# Patient Record
Sex: MALE | Race: OTHER | NOT HISPANIC OR LATINO | URBAN - METROPOLITAN AREA
[De-identification: names, ages, dates, MRNs, and addresses within clinical notes are randomized per-mention and may not be internally consistent; named-entity substitution may affect disease eponyms.]

---

## 2023-01-04 ENCOUNTER — INPATIENT (INPATIENT)
Facility: HOSPITAL | Age: 43
LOS: 6 days | Discharge: HOME | End: 2023-01-11
Attending: HOSPITALIST | Admitting: HOSPITALIST
Payer: MEDICAID

## 2023-01-04 VITALS
DIASTOLIC BLOOD PRESSURE: 86 MMHG | HEART RATE: 78 BPM | SYSTOLIC BLOOD PRESSURE: 135 MMHG | RESPIRATION RATE: 19 BRPM | TEMPERATURE: 98 F | OXYGEN SATURATION: 99 %

## 2023-01-04 LAB
ALBUMIN SERPL ELPH-MCNC: 4.3 G/DL — SIGNIFICANT CHANGE UP (ref 3.5–5.2)
ALP SERPL-CCNC: 87 U/L — SIGNIFICANT CHANGE UP (ref 30–115)
ALT FLD-CCNC: 5 U/L — SIGNIFICANT CHANGE UP (ref 0–41)
ANION GAP SERPL CALC-SCNC: 8 MMOL/L — SIGNIFICANT CHANGE UP (ref 7–14)
APPEARANCE UR: CLEAR — SIGNIFICANT CHANGE UP
AST SERPL-CCNC: 5 U/L — SIGNIFICANT CHANGE UP (ref 0–41)
BACTERIA # UR AUTO: NEGATIVE — SIGNIFICANT CHANGE UP
BASE EXCESS BLDV CALC-SCNC: 2.4 MMOL/L — SIGNIFICANT CHANGE UP (ref -2–3)
BASOPHILS # BLD AUTO: 0.06 K/UL — SIGNIFICANT CHANGE UP (ref 0–0.2)
BASOPHILS NFR BLD AUTO: 0.4 % — SIGNIFICANT CHANGE UP (ref 0–1)
BILIRUB DIRECT SERPL-MCNC: 0.2 MG/DL — SIGNIFICANT CHANGE UP (ref 0–0.3)
BILIRUB INDIRECT FLD-MCNC: 1.2 MG/DL — SIGNIFICANT CHANGE UP (ref 0.2–1.2)
BILIRUB SERPL-MCNC: 1.4 MG/DL — HIGH (ref 0.2–1.2)
BILIRUB UR-MCNC: ABNORMAL
BUN SERPL-MCNC: 7 MG/DL — LOW (ref 10–20)
CA-I SERPL-SCNC: 1.13 MMOL/L — LOW (ref 1.15–1.33)
CALCIUM SERPL-MCNC: 8.2 MG/DL — LOW (ref 8.4–10.4)
CHLORIDE SERPL-SCNC: 95 MMOL/L — LOW (ref 98–110)
CO2 SERPL-SCNC: 23 MMOL/L — SIGNIFICANT CHANGE UP (ref 17–32)
COLOR SPEC: YELLOW — SIGNIFICANT CHANGE UP
CREAT SERPL-MCNC: 0.8 MG/DL — SIGNIFICANT CHANGE UP (ref 0.7–1.5)
DIFF PNL FLD: NEGATIVE — SIGNIFICANT CHANGE UP
EGFR: 113 ML/MIN/1.73M2 — SIGNIFICANT CHANGE UP
EOSINOPHIL # BLD AUTO: 0.26 K/UL — SIGNIFICANT CHANGE UP (ref 0–0.7)
EOSINOPHIL NFR BLD AUTO: 1.7 % — SIGNIFICANT CHANGE UP (ref 0–8)
EPI CELLS # UR: 2 /HPF — SIGNIFICANT CHANGE UP (ref 0–5)
GAS PNL BLDV: 134 MMOL/L — LOW (ref 136–145)
GAS PNL BLDV: SIGNIFICANT CHANGE UP
GLUCOSE SERPL-MCNC: 207 MG/DL — HIGH (ref 70–99)
GLUCOSE UR QL: ABNORMAL
HCO3 BLDV-SCNC: 27 MMOL/L — SIGNIFICANT CHANGE UP (ref 22–29)
HCT VFR BLD CALC: 42.4 % — SIGNIFICANT CHANGE UP (ref 42–52)
HCT VFR BLDA CALC: 41 % — SIGNIFICANT CHANGE UP (ref 39–51)
HGB BLD CALC-MCNC: 13.8 G/DL — SIGNIFICANT CHANGE UP (ref 12.6–17.4)
HGB BLD-MCNC: 14 G/DL — SIGNIFICANT CHANGE UP (ref 14–18)
HYALINE CASTS # UR AUTO: 10 /LPF — HIGH (ref 0–7)
IMM GRANULOCYTES NFR BLD AUTO: 0.4 % — HIGH (ref 0.1–0.3)
KETONES UR-MCNC: ABNORMAL
LACTATE BLDV-MCNC: 0.9 MMOL/L — SIGNIFICANT CHANGE UP (ref 0.5–2)
LACTATE SERPL-SCNC: 0.9 MMOL/L — SIGNIFICANT CHANGE UP (ref 0.7–2)
LEUKOCYTE ESTERASE UR-ACNC: NEGATIVE — SIGNIFICANT CHANGE UP
LIDOCAIN IGE QN: 124 U/L — HIGH (ref 7–60)
LYMPHOCYTES # BLD AUTO: 19 % — LOW (ref 20.5–51.1)
LYMPHOCYTES # BLD AUTO: 2.85 K/UL — SIGNIFICANT CHANGE UP (ref 1.2–3.4)
MCHC RBC-ENTMCNC: 28.9 PG — SIGNIFICANT CHANGE UP (ref 27–31)
MCHC RBC-ENTMCNC: 33 G/DL — SIGNIFICANT CHANGE UP (ref 32–37)
MCV RBC AUTO: 87.4 FL — SIGNIFICANT CHANGE UP (ref 80–94)
MONOCYTES # BLD AUTO: 0.82 K/UL — HIGH (ref 0.1–0.6)
MONOCYTES NFR BLD AUTO: 5.5 % — SIGNIFICANT CHANGE UP (ref 1.7–9.3)
NEUTROPHILS # BLD AUTO: 10.93 K/UL — HIGH (ref 1.4–6.5)
NEUTROPHILS NFR BLD AUTO: 73 % — SIGNIFICANT CHANGE UP (ref 42.2–75.2)
NITRITE UR-MCNC: NEGATIVE — SIGNIFICANT CHANGE UP
NRBC # BLD: 0 /100 WBCS — SIGNIFICANT CHANGE UP (ref 0–0)
PCO2 BLDV: 42 MMHG — SIGNIFICANT CHANGE UP (ref 42–55)
PH BLDV: 7.42 — SIGNIFICANT CHANGE UP (ref 7.32–7.43)
PH UR: 6 — SIGNIFICANT CHANGE UP (ref 5–8)
PLATELET # BLD AUTO: 220 K/UL — SIGNIFICANT CHANGE UP (ref 130–400)
PO2 BLDV: 49 MMHG — SIGNIFICANT CHANGE UP
POTASSIUM BLDV-SCNC: 3.6 MMOL/L — SIGNIFICANT CHANGE UP (ref 3.5–5.1)
POTASSIUM SERPL-MCNC: 6.9 MMOL/L — CRITICAL HIGH (ref 3.5–5)
POTASSIUM SERPL-SCNC: 6.9 MMOL/L — CRITICAL HIGH (ref 3.5–5)
PROT SERPL-MCNC: 7.4 G/DL — SIGNIFICANT CHANGE UP (ref 6–8)
PROT UR-MCNC: ABNORMAL
RBC # BLD: 4.85 M/UL — SIGNIFICANT CHANGE UP (ref 4.7–6.1)
RBC # FLD: 12.7 % — SIGNIFICANT CHANGE UP (ref 11.5–14.5)
RBC CASTS # UR COMP ASSIST: 2 /HPF — SIGNIFICANT CHANGE UP (ref 0–4)
SAO2 % BLDV: 83 % — SIGNIFICANT CHANGE UP
SARS-COV-2 RNA SPEC QL NAA+PROBE: SIGNIFICANT CHANGE UP
SODIUM SERPL-SCNC: 126 MMOL/L — LOW (ref 135–146)
SP GR SPEC: 1.05 — HIGH (ref 1.01–1.03)
TROPONIN T SERPL-MCNC: <0.01 NG/ML — SIGNIFICANT CHANGE UP
UROBILINOGEN FLD QL: ABNORMAL
WBC # BLD: 14.98 K/UL — HIGH (ref 4.8–10.8)
WBC # FLD AUTO: 14.98 K/UL — HIGH (ref 4.8–10.8)
WBC UR QL: 2 /HPF — SIGNIFICANT CHANGE UP (ref 0–5)

## 2023-01-04 PROCEDURE — 99285 EMERGENCY DEPT VISIT HI MDM: CPT

## 2023-01-04 PROCEDURE — 71045 X-RAY EXAM CHEST 1 VIEW: CPT | Mod: 26

## 2023-01-04 PROCEDURE — 74177 CT ABD & PELVIS W/CONTRAST: CPT | Mod: 26,MA

## 2023-01-04 PROCEDURE — 93010 ELECTROCARDIOGRAM REPORT: CPT

## 2023-01-04 RX ORDER — SODIUM CHLORIDE 9 MG/ML
1000 INJECTION INTRAMUSCULAR; INTRAVENOUS; SUBCUTANEOUS ONCE
Refills: 0 | Status: COMPLETED | OUTPATIENT
Start: 2023-01-04 | End: 2023-01-04

## 2023-01-04 RX ORDER — ONDANSETRON 8 MG/1
4 TABLET, FILM COATED ORAL ONCE
Refills: 0 | Status: COMPLETED | OUTPATIENT
Start: 2023-01-04 | End: 2023-01-04

## 2023-01-04 RX ORDER — MORPHINE SULFATE 50 MG/1
4 CAPSULE, EXTENDED RELEASE ORAL ONCE
Refills: 0 | Status: DISCONTINUED | OUTPATIENT
Start: 2023-01-04 | End: 2023-01-04

## 2023-01-04 RX ORDER — FAMOTIDINE 10 MG/ML
20 INJECTION INTRAVENOUS ONCE
Refills: 0 | Status: COMPLETED | OUTPATIENT
Start: 2023-01-04 | End: 2023-01-04

## 2023-01-04 RX ORDER — SODIUM CHLORIDE 9 MG/ML
1000 INJECTION, SOLUTION INTRAVENOUS ONCE
Refills: 0 | Status: COMPLETED | OUTPATIENT
Start: 2023-01-04 | End: 2023-01-04

## 2023-01-04 RX ORDER — SODIUM CHLORIDE 9 MG/ML
1000 INJECTION, SOLUTION INTRAVENOUS
Refills: 0 | Status: DISCONTINUED | OUTPATIENT
Start: 2023-01-04 | End: 2023-01-05

## 2023-01-04 RX ADMIN — ONDANSETRON 4 MILLIGRAM(S): 8 TABLET, FILM COATED ORAL at 18:08

## 2023-01-04 RX ADMIN — MORPHINE SULFATE 4 MILLIGRAM(S): 50 CAPSULE, EXTENDED RELEASE ORAL at 18:08

## 2023-01-04 RX ADMIN — FAMOTIDINE 20 MILLIGRAM(S): 10 INJECTION INTRAVENOUS at 18:08

## 2023-01-04 RX ADMIN — SODIUM CHLORIDE 1000 MILLILITER(S): 9 INJECTION INTRAMUSCULAR; INTRAVENOUS; SUBCUTANEOUS at 18:08

## 2023-01-04 RX ADMIN — ONDANSETRON 4 MILLIGRAM(S): 8 TABLET, FILM COATED ORAL at 18:39

## 2023-01-04 NOTE — ED PROVIDER NOTE - CLINICAL SUMMARY MEDICAL DECISION MAKING FREE TEXT BOX
42-year-old male with history of pancreatitis, in ER with c/o abdominal pain which started yesterday.  No N/V/D.  Patient recently moved here from [facility], does not have any established physicians here.  Labs reviewed: WBC 14, Hgb 14.0 CMP with hyponatremia (126) BUN/creatinine 7/0.8, K+ hemolyzed > 3.6 on VBG, lactate negative.  AST 5 ALT 5, T bili 1.4, lipase 124.  CT abdomen: Findings compatible with acute interstitial edematous pancreatitis, no evidence of necrosis or peripancreatic fluid collections, hepatic steatosis and hepatomegaly.  Patient given IVF, pain meds in ER.  To admit to medicine for continued care/GI evaluation.

## 2023-01-04 NOTE — ED PROVIDER NOTE - NS ED ROS FT
Constitutional: (-) fever  Eyes/ENT: (-) blurry vision, (-) epistaxis  Cardiovascular: (-) chest pain, (-) syncope  Respiratory: (-) cough, (-) shortness of breath  Gastrointestinal: (-) vomiting, (-) diarrhea, (+)abd pain  Musculoskeletal: (-) neck pain, (-) back pain, (-) joint pain  Integumentary: (-) rash, (-) edema  Neurological: (-) headache, (-) altered mental status  Psychiatric: (-) hallucinations  Allergic/Immunologic: (-) pruritus

## 2023-01-04 NOTE — ED PROVIDER NOTE - OBJECTIVE STATEMENT
42-year-old male with history of pancreas problem presents to the ED accompanied by sister complaining of bandlike abdominal pain that started yesterday afternoon.  Patient states pain got worse today.  Patient attempted to take pantoprazole with no relief.  No nausea, vomiting, diarrhea, fever, chills or urinary symptoms.

## 2023-01-04 NOTE — ED PROVIDER NOTE - ATTENDING APP SHARED VISIT CONTRIBUTION OF CARE
42-year-old male with h/o pancreatitis, in ER with c/o abdominal pain which started yesterday.  Diffuse pain to mid abdomen, radiating around to the back.  Did waxing and waning yesterday, pain worse today.  Denies any N/V/D.  No urinary symptoms.  No F/C.  No CP/SOB.  Denies any recent alcohol use.  s/p appendectomy.  Per patient sister, patient has been on meds for pancreatitis in the past, however recently moved here 1 1/2 months ago, does not have any physicians here.  PE - nad, nc/at, eomi, perrl, op - clear, mmm, neck supple, cta b/l, no w/r/r, rrr, abd- soft, + diffuse tenderness, nabs, from x 4, no LE swelling/tenderness, A&O x 3, cn 2-12 intact, no focal motor/sensory deficits.   -Check labs, CT abdomen 42-year-old male with h/o pancreatitis, in ER with c/o abdominal pain which started yesterday.  Diffuse pain to mid abdomen, radiating around to the back.  Did waxing and waning yesterday, pain worse today.  Denies any N/V/D.  No urinary symptoms.  No F/C.  No CP/SOB.  Denies any recent alcohol use.  s/p appendectomy.  Per patient's sister, patient has been on meds for pancreatitis in the past, however recently moved here 1 1/2 months ago from [facility], does not have any physicians here. Drinks socially, denies any recent heavy alcohol use.   PE - nad, nc/at, eomi, perrl, op - clear, mmm, neck supple, cta b/l, no w/r/r, rrr, abd- soft, + diffuse tenderness, nabs, from x 4, no LE swelling/tenderness, A&O x 3, cn 2-12 intact, no focal motor/sensory deficits.   -Check labs, CT abdomen

## 2023-01-04 NOTE — ED ADULT TRIAGE NOTE - INTERNATIONAL TRAVEL
Detail Level: Detailed Otc Regimen: Recommended supplement N acetyl cysteine 600 mg tablets weeks 1-3 take 1 tablet 2 times daily, weeks 4-6 take 2 tablets 2 a day, week 7 and on take 2 tablets in the morning and 3 tablets at night. Continue Regimen: Antipsychotics per PCP Plan: Recommended patient to follow up with her primary doctor about starting this new supplement. Recommend she inform her PCP that these sensations started after discontinuing amitriptyline. No

## 2023-01-04 NOTE — ED PROVIDER NOTE - OTHER SOCIAL DETERMINANTS OF HEALTH
just moved here from [facility], does not have any established physicians here.

## 2023-01-04 NOTE — ED PROVIDER NOTE - NS ED ATTENDING STATEMENT MOD
This was a shared visit with the JAG. I reviewed and verified the documentation and independently performed the documented:

## 2023-01-04 NOTE — ED ADULT NURSE NOTE - NSIMPLEMENTINTERV_GEN_ALL_ED
Implemented All Universal Safety Interventions:  [place] to call system. Call bell, personal items and telephone within reach. Instruct patient to call for assistance. Room bathroom lighting operational. Non-slip footwear when patient is off stretcher. Physically safe environment: no spills, clutter or unnecessary equipment. Stretcher in lowest position, wheels locked, appropriate side rails in place.

## 2023-01-04 NOTE — ED PROVIDER NOTE - PHYSICAL EXAMINATION
Vital Signs: I have reviewed the initial vital signs.  Constitutional: NAD, well-nourished, appears stated age, no acute distress.  HEENT: Airway patent, moist MM, no erythema/swelling/deformity of oral structures. EOMI, PERRLA.  CV: regular rate, regular rhythm, well-perfused extremities, 2+ b/l DP and radial pulses equal.  Lungs: BCTA, no increased WOB.  ABD: +tender upper abdomen, ND, no guarding or rebound, no pulsatile mass, no hernias.   MSK: Neck supple, nontender, nl ROM, no stepoff. Chest nontender. Back nontender. Ext nontender, nl rom, no deformity.   INTEG: Skin warm, dry, no rash.  NEURO: A&Ox3, normal strength, nl sensation throughout, normal speech.   PSYCH: Calm, cooperative, normal affect and interaction.

## 2023-01-05 LAB
A1C WITH ESTIMATED AVERAGE GLUCOSE RESULT: 10.5 % — HIGH (ref 4–5.6)
ALBUMIN SERPL ELPH-MCNC: 3.3 G/DL — LOW (ref 3.5–5.2)
ALBUMIN SERPL ELPH-MCNC: 3.7 G/DL — SIGNIFICANT CHANGE UP (ref 3.5–5.2)
ALP SERPL-CCNC: 81 U/L — SIGNIFICANT CHANGE UP (ref 30–115)
ALP SERPL-CCNC: 90 U/L — SIGNIFICANT CHANGE UP (ref 30–115)
ALT FLD-CCNC: 20 U/L — SIGNIFICANT CHANGE UP (ref 0–41)
ALT FLD-CCNC: <5 U/L — SIGNIFICANT CHANGE UP (ref 0–41)
ANION GAP SERPL CALC-SCNC: 8 MMOL/L — SIGNIFICANT CHANGE UP (ref 7–14)
APPEARANCE UR: CLEAR — SIGNIFICANT CHANGE UP
APTT BLD: 32.8 SEC — SIGNIFICANT CHANGE UP (ref 27–39.2)
AST SERPL-CCNC: 9 U/L — SIGNIFICANT CHANGE UP (ref 0–41)
AST SERPL-CCNC: <5 U/L — SIGNIFICANT CHANGE UP (ref 0–41)
BASOPHILS # BLD AUTO: 0.03 K/UL — SIGNIFICANT CHANGE UP (ref 0–0.2)
BASOPHILS NFR BLD AUTO: 0.2 % — SIGNIFICANT CHANGE UP (ref 0–1)
BILIRUB SERPL-MCNC: 1.5 MG/DL — HIGH (ref 0.2–1.2)
BILIRUB SERPL-MCNC: 1.5 MG/DL — HIGH (ref 0.2–1.2)
BILIRUB UR-MCNC: NEGATIVE — SIGNIFICANT CHANGE UP
BLD GP AB SCN SERPL QL: SIGNIFICANT CHANGE UP
BUN SERPL-MCNC: 6 MG/DL — LOW (ref 10–20)
BUN SERPL-MCNC: 6 MG/DL — LOW (ref 10–20)
BUN SERPL-MCNC: 7 MG/DL — LOW (ref 10–20)
CALCIUM SERPL-MCNC: 8.2 MG/DL — LOW (ref 8.4–10.5)
CALCIUM SERPL-MCNC: 8.3 MG/DL — LOW (ref 8.4–10.4)
CALCIUM SERPL-MCNC: 8.5 MG/DL — SIGNIFICANT CHANGE UP (ref 8.4–10.5)
CERULOPLASMIN SERPL-MCNC: <3 MG/DL — LOW (ref 15–30)
CHLORIDE SERPL-SCNC: 101 MMOL/L — SIGNIFICANT CHANGE UP (ref 98–110)
CHLORIDE SERPL-SCNC: 103 MMOL/L — SIGNIFICANT CHANGE UP (ref 98–110)
CHLORIDE SERPL-SCNC: 99 MMOL/L — SIGNIFICANT CHANGE UP (ref 98–110)
CHOLEST SERPL-MCNC: 469 MG/DL — HIGH
CO2 SERPL-SCNC: 24 MMOL/L — SIGNIFICANT CHANGE UP (ref 17–32)
CO2 SERPL-SCNC: 24 MMOL/L — SIGNIFICANT CHANGE UP (ref 17–32)
CO2 SERPL-SCNC: 27 MMOL/L — SIGNIFICANT CHANGE UP (ref 17–32)
COLOR SPEC: YELLOW — SIGNIFICANT CHANGE UP
CREAT SERPL-MCNC: 0.7 MG/DL — SIGNIFICANT CHANGE UP (ref 0.7–1.5)
CREAT SERPL-MCNC: 0.8 MG/DL — SIGNIFICANT CHANGE UP (ref 0.7–1.5)
CREAT SERPL-MCNC: 0.8 MG/DL — SIGNIFICANT CHANGE UP (ref 0.7–1.5)
DIFF PNL FLD: NEGATIVE — SIGNIFICANT CHANGE UP
EGFR: 113 ML/MIN/1.73M2 — SIGNIFICANT CHANGE UP
EGFR: 113 ML/MIN/1.73M2 — SIGNIFICANT CHANGE UP
EGFR: 117 ML/MIN/1.73M2 — SIGNIFICANT CHANGE UP
EOSINOPHIL # BLD AUTO: 0.08 K/UL — SIGNIFICANT CHANGE UP (ref 0–0.7)
EOSINOPHIL NFR BLD AUTO: 0.6 % — SIGNIFICANT CHANGE UP (ref 0–8)
ESTIMATED AVERAGE GLUCOSE: 255 MG/DL — HIGH (ref 68–114)
GLUCOSE BLDC GLUCOMTR-MCNC: 139 MG/DL — HIGH (ref 70–99)
GLUCOSE BLDC GLUCOMTR-MCNC: 156 MG/DL — HIGH (ref 70–99)
GLUCOSE BLDC GLUCOMTR-MCNC: 167 MG/DL — HIGH (ref 70–99)
GLUCOSE BLDC GLUCOMTR-MCNC: 167 MG/DL — HIGH (ref 70–99)
GLUCOSE BLDC GLUCOMTR-MCNC: 172 MG/DL — HIGH (ref 70–99)
GLUCOSE BLDC GLUCOMTR-MCNC: 177 MG/DL — HIGH (ref 70–99)
GLUCOSE BLDC GLUCOMTR-MCNC: 190 MG/DL — HIGH (ref 70–99)
GLUCOSE SERPL-MCNC: 153 MG/DL — HIGH (ref 70–99)
GLUCOSE SERPL-MCNC: 208 MG/DL — HIGH (ref 70–99)
GLUCOSE SERPL-MCNC: 229 MG/DL — HIGH (ref 70–99)
GLUCOSE UR QL: ABNORMAL
HCT VFR BLD CALC: 43.5 % — SIGNIFICANT CHANGE UP (ref 42–52)
HDLC SERPL-MCNC: 23 MG/DL — LOW
HGB BLD-MCNC: 15.2 G/DL — SIGNIFICANT CHANGE UP (ref 14–18)
IMM GRANULOCYTES NFR BLD AUTO: 0.6 % — HIGH (ref 0.1–0.3)
INR BLD: 1.03 RATIO — SIGNIFICANT CHANGE UP (ref 0.65–1.3)
KETONES UR-MCNC: ABNORMAL
LDLC SERPL DIRECT ASSAY-MCNC: 28 MG/DL — SIGNIFICANT CHANGE UP
LEUKOCYTE ESTERASE UR-ACNC: NEGATIVE — SIGNIFICANT CHANGE UP
LIPID PNL WITH DIRECT LDL SERPL: ABNORMAL
LYMPHOCYTES # BLD AUTO: 23.5 % — SIGNIFICANT CHANGE UP (ref 20.5–51.1)
LYMPHOCYTES # BLD AUTO: 3.31 K/UL — SIGNIFICANT CHANGE UP (ref 1.2–3.4)
MAGNESIUM SERPL-MCNC: 1.8 MG/DL — SIGNIFICANT CHANGE UP (ref 1.8–2.4)
MCHC RBC-ENTMCNC: 31.1 PG — HIGH (ref 27–31)
MCHC RBC-ENTMCNC: 34.9 G/DL — SIGNIFICANT CHANGE UP (ref 32–37)
MCV RBC AUTO: 89.1 FL — SIGNIFICANT CHANGE UP (ref 80–94)
MONOCYTES # BLD AUTO: 0.68 K/UL — HIGH (ref 0.1–0.6)
MONOCYTES NFR BLD AUTO: 4.8 % — SIGNIFICANT CHANGE UP (ref 1.7–9.3)
NEUTROPHILS # BLD AUTO: 9.91 K/UL — HIGH (ref 1.4–6.5)
NEUTROPHILS NFR BLD AUTO: 70.3 % — SIGNIFICANT CHANGE UP (ref 42.2–75.2)
NITRITE UR-MCNC: NEGATIVE — SIGNIFICANT CHANGE UP
NON HDL CHOLESTEROL: 446 MG/DL — HIGH
NRBC # BLD: 0 /100 WBCS — SIGNIFICANT CHANGE UP (ref 0–0)
OSMOLALITY SERPL: 292 MOS/KG — SIGNIFICANT CHANGE UP (ref 275–300)
PH UR: 6.5 — SIGNIFICANT CHANGE UP (ref 5–8)
PLATELET # BLD AUTO: 164 K/UL — SIGNIFICANT CHANGE UP (ref 130–400)
POTASSIUM SERPL-MCNC: 3.7 MMOL/L — SIGNIFICANT CHANGE UP (ref 3.5–5)
POTASSIUM SERPL-MCNC: 3.8 MMOL/L — SIGNIFICANT CHANGE UP (ref 3.5–5)
POTASSIUM SERPL-MCNC: 3.9 MMOL/L — SIGNIFICANT CHANGE UP (ref 3.5–5)
POTASSIUM SERPL-SCNC: 3.7 MMOL/L — SIGNIFICANT CHANGE UP (ref 3.5–5)
POTASSIUM SERPL-SCNC: 3.8 MMOL/L — SIGNIFICANT CHANGE UP (ref 3.5–5)
POTASSIUM SERPL-SCNC: 3.9 MMOL/L — SIGNIFICANT CHANGE UP (ref 3.5–5)
PROT SERPL-MCNC: 5.8 G/DL — LOW (ref 6–8)
PROT SERPL-MCNC: 6.5 G/DL — SIGNIFICANT CHANGE UP (ref 6–8)
PROT UR-MCNC: SIGNIFICANT CHANGE UP
PROTHROM AB SERPL-ACNC: 11.7 SEC — SIGNIFICANT CHANGE UP (ref 9.95–12.87)
RBC # BLD: 4.88 M/UL — SIGNIFICANT CHANGE UP (ref 4.7–6.1)
RBC # FLD: 12.6 % — SIGNIFICANT CHANGE UP (ref 11.5–14.5)
SODIUM SERPL-SCNC: 131 MMOL/L — LOW (ref 135–146)
SODIUM SERPL-SCNC: 133 MMOL/L — LOW (ref 135–146)
SODIUM SERPL-SCNC: 138 MMOL/L — SIGNIFICANT CHANGE UP (ref 135–146)
SP GR SPEC: 1.01 — SIGNIFICANT CHANGE UP (ref 1.01–1.03)
TRIGL SERPL-MCNC: 2411 MG/DL — HIGH
TSH SERPL-MCNC: 0.43 UIU/ML — SIGNIFICANT CHANGE UP (ref 0.27–4.2)
UROBILINOGEN FLD QL: ABNORMAL
WBC # BLD: 14.09 K/UL — HIGH (ref 4.8–10.8)
WBC # FLD AUTO: 14.09 K/UL — HIGH (ref 4.8–10.8)

## 2023-01-05 PROCEDURE — 99223 1ST HOSP IP/OBS HIGH 75: CPT

## 2023-01-05 PROCEDURE — 76705 ECHO EXAM OF ABDOMEN: CPT | Mod: 26

## 2023-01-05 PROCEDURE — 99233 SBSQ HOSP IP/OBS HIGH 50: CPT

## 2023-01-05 RX ORDER — SODIUM CHLORIDE 9 MG/ML
1000 INJECTION, SOLUTION INTRAVENOUS
Refills: 0 | Status: DISCONTINUED | OUTPATIENT
Start: 2023-01-05 | End: 2023-01-05

## 2023-01-05 RX ORDER — PANTOPRAZOLE SODIUM 20 MG/1
40 TABLET, DELAYED RELEASE ORAL
Refills: 0 | Status: DISCONTINUED | OUTPATIENT
Start: 2023-01-05 | End: 2023-01-05

## 2023-01-05 RX ORDER — DEXTROSE 50 % IN WATER 50 %
50 SYRINGE (ML) INTRAVENOUS
Refills: 0 | Status: DISCONTINUED | OUTPATIENT
Start: 2023-01-05 | End: 2023-01-11

## 2023-01-05 RX ORDER — MORPHINE SULFATE 50 MG/1
2 CAPSULE, EXTENDED RELEASE ORAL EVERY 6 HOURS
Refills: 0 | Status: DISCONTINUED | OUTPATIENT
Start: 2023-01-05 | End: 2023-01-11

## 2023-01-05 RX ORDER — ONDANSETRON 8 MG/1
4 TABLET, FILM COATED ORAL EVERY 8 HOURS
Refills: 0 | Status: DISCONTINUED | OUTPATIENT
Start: 2023-01-05 | End: 2023-01-11

## 2023-01-05 RX ORDER — NICOTINE POLACRILEX 2 MG
1 GUM BUCCAL DAILY
Refills: 0 | Status: DISCONTINUED | OUTPATIENT
Start: 2023-01-05 | End: 2023-01-11

## 2023-01-05 RX ORDER — ACETAMINOPHEN 500 MG
650 TABLET ORAL EVERY 6 HOURS
Refills: 0 | Status: DISCONTINUED | OUTPATIENT
Start: 2023-01-05 | End: 2023-01-11

## 2023-01-05 RX ORDER — PANTOPRAZOLE SODIUM 20 MG/1
40 TABLET, DELAYED RELEASE ORAL
Refills: 0 | Status: DISCONTINUED | OUTPATIENT
Start: 2023-01-06 | End: 2023-01-11

## 2023-01-05 RX ORDER — INSULIN HUMAN 100 [IU]/ML
10 INJECTION, SOLUTION SUBCUTANEOUS
Qty: 100 | Refills: 0 | Status: DISCONTINUED | OUTPATIENT
Start: 2023-01-05 | End: 2023-01-05

## 2023-01-05 RX ORDER — ENOXAPARIN SODIUM 100 MG/ML
40 INJECTION SUBCUTANEOUS EVERY 24 HOURS
Refills: 0 | Status: DISCONTINUED | OUTPATIENT
Start: 2023-01-05 | End: 2023-01-11

## 2023-01-05 RX ORDER — DIPHENHYDRAMINE HCL 50 MG
25 CAPSULE ORAL ONCE
Refills: 0 | Status: COMPLETED | OUTPATIENT
Start: 2023-01-05 | End: 2023-01-05

## 2023-01-05 RX ORDER — FAMOTIDINE 10 MG/ML
20 INJECTION INTRAVENOUS
Refills: 0 | Status: DISCONTINUED | OUTPATIENT
Start: 2023-01-05 | End: 2023-01-05

## 2023-01-05 RX ORDER — INSULIN HUMAN 100 [IU]/ML
10 INJECTION, SOLUTION SUBCUTANEOUS
Qty: 100 | Refills: 0 | Status: DISCONTINUED | OUTPATIENT
Start: 2023-01-05 | End: 2023-01-08

## 2023-01-05 RX ORDER — POTASSIUM CHLORIDE 20 MEQ
40 PACKET (EA) ORAL ONCE
Refills: 0 | Status: COMPLETED | OUTPATIENT
Start: 2023-01-05 | End: 2023-01-05

## 2023-01-05 RX ORDER — ATORVASTATIN CALCIUM 80 MG/1
80 TABLET, FILM COATED ORAL AT BEDTIME
Refills: 0 | Status: DISCONTINUED | OUTPATIENT
Start: 2023-01-05 | End: 2023-01-09

## 2023-01-05 RX ORDER — FENOFIBRATE,MICRONIZED 130 MG
145 CAPSULE ORAL DAILY
Refills: 0 | Status: DISCONTINUED | OUTPATIENT
Start: 2023-01-05 | End: 2023-01-11

## 2023-01-05 RX ORDER — SODIUM CHLORIDE 9 MG/ML
1000 INJECTION, SOLUTION INTRAVENOUS
Refills: 0 | Status: DISCONTINUED | OUTPATIENT
Start: 2023-01-05 | End: 2023-01-06

## 2023-01-05 RX ORDER — IBUPROFEN 200 MG
400 TABLET ORAL ONCE
Refills: 0 | Status: COMPLETED | OUTPATIENT
Start: 2023-01-05 | End: 2023-01-05

## 2023-01-05 RX ADMIN — Medication 145 MILLIGRAM(S): at 13:50

## 2023-01-05 RX ADMIN — Medication 400 MILLIGRAM(S): at 15:49

## 2023-01-05 RX ADMIN — ENOXAPARIN SODIUM 40 MILLIGRAM(S): 100 INJECTION SUBCUTANEOUS at 12:28

## 2023-01-05 RX ADMIN — SODIUM CHLORIDE 150 MILLILITER(S): 9 INJECTION, SOLUTION INTRAVENOUS at 15:54

## 2023-01-05 RX ADMIN — Medication 1 PATCH: at 12:16

## 2023-01-05 RX ADMIN — Medication 400 MILLIGRAM(S): at 17:59

## 2023-01-05 RX ADMIN — INSULIN HUMAN 10 UNIT(S)/HR: 100 INJECTION, SOLUTION SUBCUTANEOUS at 16:56

## 2023-01-05 RX ADMIN — SODIUM CHLORIDE 200 MILLILITER(S): 9 INJECTION, SOLUTION INTRAVENOUS at 12:23

## 2023-01-05 RX ADMIN — Medication 1 PATCH: at 19:33

## 2023-01-05 RX ADMIN — PANTOPRAZOLE SODIUM 40 MILLIGRAM(S): 20 TABLET, DELAYED RELEASE ORAL at 06:06

## 2023-01-05 RX ADMIN — SODIUM CHLORIDE 150 MILLILITER(S): 9 INJECTION, SOLUTION INTRAVENOUS at 19:52

## 2023-01-05 RX ADMIN — ATORVASTATIN CALCIUM 80 MILLIGRAM(S): 80 TABLET, FILM COATED ORAL at 21:43

## 2023-01-05 RX ADMIN — SODIUM CHLORIDE 100 MILLILITER(S): 9 INJECTION, SOLUTION INTRAVENOUS at 20:22

## 2023-01-05 RX ADMIN — Medication 40 MILLIEQUIVALENT(S): at 20:28

## 2023-01-05 RX ADMIN — SODIUM CHLORIDE 150 MILLILITER(S): 9 INJECTION, SOLUTION INTRAVENOUS at 21:43

## 2023-01-05 RX ADMIN — SODIUM CHLORIDE 200 MILLILITER(S): 9 INJECTION, SOLUTION INTRAVENOUS at 01:19

## 2023-01-05 RX ADMIN — Medication 25 MILLIGRAM(S): at 12:20

## 2023-01-05 RX ADMIN — SODIUM CHLORIDE 200 MILLILITER(S): 9 INJECTION, SOLUTION INTRAVENOUS at 06:06

## 2023-01-05 NOTE — H&P ADULT - NSHPREVIEWOFSYSTEMS_GEN_ALL_CORE
CONSTITUTIONAL: +ve weakness, no fevers or chills  EYES/ENT: No visual changes;  No vertigo or throat pain   RESPIRATORY: No cough, wheezing, hemoptysis; No shortness of breath  CARDIOVASCULAR: No chest pain or palpitations  GASTROINTESTINAL: epigastric pain that radiates to the back, constipation  GENITOURINARY: No dysuria, frequency or hematuria  NEUROLOGICAL: No numbness or weakness

## 2023-01-05 NOTE — PROGRESS NOTE ADULT - SUBJECTIVE AND OBJECTIVE BOX
JOSE TAM 42y Male  MRN#: [id number]   Hospital Day: 1d    SUBJECTIVE  Patient is a 42y old Male who presents with a chief complaint of Currently admitted to medicine with the primary diagnosis of Acute pancreatitis    INTERVAL HPI AND OVERNIGHT EVENTS:  Patient was examined and seen at bedside. This morning he reports mild epigastric pain. Developed an abdominal rash today that has been resolved.     OBJECTIVE  PAST MEDICAL & SURGICAL HISTORY    ALLERGIES:  No Known Allergies    MEDICATIONS:  STANDING MEDICATIONS  diphenhydrAMINE 25 milliGRAM(s) Oral once  enoxaparin Injectable 40 milliGRAM(s) SubCutaneous every 24 hours  famotidine    Tablet 20 milliGRAM(s) Oral two times a day  lactated ringers. 1000 milliLiter(s) IV Continuous <Continuous>  nicotine -  14 mG/24Hr(s) Patch 1 Patch Transdermal daily    PRN MEDICATIONS  acetaminophen     Tablet .. 650 milliGRAM(s) Oral every 6 hours PRN  ondansetron Injectable 4 milliGRAM(s) IV Push every 8 hours PRN      VITAL SIGNS: Last 24 Hours  T(C): 37.6 (2023 08:01), Max: 37.9 (2023 01:00)  T(F): 99.7 (2023 08:01), Max: 100.3 (2023 01:00)  HR: 107 (2023 08:01) (78 - 115)  BP: 116/58 (2023 08:01) (110/74 - 135/86)  BP(mean): 88 (2023 01:00) (88 - 88)  RR: 19 (2023 08:01) (18 - 19)  SpO2: 94% (2023 08:01) (94% - 99%)    LABS:                        14.0   14.98 )-----------( 220      ( 2023 17:55 )             42.4     01-04    126<L>  |  95<L>  |  7<L>  ----------------------------<  207<H>  6.9<HH>   |  23  |  0.8    Ca    8.2<L>      2023 17:55    TPro  7.4  /  Alb  4.3  /  TBili  1.4<H>  /  DBili  0.2  /  AST  5   /  ALT  5   /  AlkPhos  87        Urinalysis Basic - ( 2023 19:35 )    Color: Yellow / Appearance: Clear / S.047 / pH: x  Gluc: x / Ketone: Small  / Bili: Small / Urobili: 3 mg/dL   Blood: x / Protein: 30 mg/dL / Nitrite: Negative   Leuk Esterase: Negative / RBC: 2 /HPF / WBC 2 /HPF   Sq Epi: x / Non Sq Epi: 2 /HPF / Bacteria: Negative        Lactate, Blood: 0.9 mmol/L (23 @ 17:55)  Troponin T, Serum: <0.01 ng/mL (23 @ 17:55)      CARDIAC MARKERS ( 2023 17:55 )  x     / <0.01 ng/mL / x     / x     / x          PHYSICAL EXAM:  CONSTITUTIONAL: No acute distress, well-developed, well-groomed, AAOx3  PULMONARY: Clear to auscultation bilaterally  CARDIOVASCULAR: Regular rate and rhythm; no murmurs, rubs, or gallops  GASTROINTESTINAL: Soft, mild tenderness to palpation  MUSCULOSKELETAL: 2+ peripheral pulses; no clubbing, no cyanosis, no edema  NEUROLOGY: non-focal  SKIN: raised/puffy rash with central clearing concentrated in the bilateral upper quadrants radiating to the flank

## 2023-01-05 NOTE — PATIENT PROFILE ADULT - FALL HARM RISK - HARM RISK INTERVENTIONS

## 2023-01-05 NOTE — CONSULT NOTE ADULT - ASSESSMENT
IMPRESSION:    Hypertriglyceridemia-induced pancreatitis  Hypovolemic hyponatremia  Hepatic steatosis  Active smoker  HO alcohol use, pancreatitis    PLAN:    CNS: Avoid sedatives. Nicotine patch. Pain control.    HEENT: Oral care    PULMONARY: HOB @ 45 degrees. Aspiration precautions. Counseled patient on smoking cessation.    CARDIOVASCULAR: Goal directed fluid resuscitation. Avoid volume overload.    GI: GI prophylaxis. Feeding: Clear liquids. Advance diet as tolerated. Bowel regimen. Trend LFTs.    RENAL: Follow up renal function and lytes. Correct as needed.    INFECTIOUS DISEASE: Monitor vital signs. Follow up cultures    HEMATOLOGICAL: DVT prophylaxis. Obtain type and screen and coags in case patient may require plasma exchange due to refractory hyperglyceridemia.    ENDOCRINE: Start insulin infusion at 0.1-0.3 units/kg/hr. Fingersticks Q1H. If blood glucose <200, add D5W to IV fluids to keep fingersticks 150-200. Repeat triglycerides Q12H. Endocrinology evaluation. A1c. TSH.    MUSCULOSKELETAL: Bedrest    DISPO: Patient requires MICU monitoring at this time  IMPRESSION:    Hypertriglyceridemia-induced pancreatitis  Hypovolemic hyponatremia  Hepatic steatosis  Active smoker  HO alcohol use, pancreatitis    PLAN:    CNS: Avoid sedatives. Nicotine patch. Pain control.    HEENT: Oral care    PULMONARY: HOB @ 45 degrees. Aspiration precautions. Counseled patient on smoking cessation.    CARDIOVASCULAR: Goal directed fluid resuscitation. Start LR at 125mL/hr. Avoid volume overload.    GI: GI prophylaxis. Feeding: Clear liquids. Advance diet as tolerated. Bowel regimen. Trend LFTs.    RENAL: Follow up renal function and lytes. Correct as needed. Keep K>4    INFECTIOUS DISEASE: Monitor vital signs. Follow up cultures    HEMATOLOGICAL: DVT prophylaxis. Obtain type and screen and coags in case patient may require plasma exchange due to refractory hyperglyceridemia.    ENDOCRINE: Start insulin infusion at 0.1-0.3 units/kg/hr. Fingersticks Q1H. If blood glucose <200, add D5W to IV fluids to keep fingersticks 150-200. Repeat triglycerides Q12H. Endocrinology evaluation. A1c. TSH.    MUSCULOSKELETAL: Bedrest    DISPO: Patient requires MICU monitoring at this time  IMPRESSION:    Hypertriglyceridemia-induced pancreatitis  Hypovolemic hyponatremia  Hepatic steatosis  Active smoker  HO alcohol use, pancreatitis    PLAN:    CNS: Avoid sedatives. Nicotine patch. Pain control.     HEENT: Oral care    PULMONARY: HOB @ 45 degrees. Aspiration precautions. Counseled patient on smoking cessation. CXR is normal.     CARDIOVASCULAR: Goal directed fluid resuscitation. Start LR at 150/hr. Avoid volume overload.     GI: GI prophylaxis. Feeding: Clear liquids. Advance diet as tolerated. Bowel regimen. Trend LFTs. US abdomen done with no evidence of stones. GI evaluation to see if he needs insulin infusion.     RENAL: Follow up renal function and lytes. Correct as needed. Keep K>4    INFECTIOUS DISEASE: Monitor vital signs. Follow up cultures    HEMATOLOGICAL: DVT prophylaxis; keep hgb more than 7; check coags     ENDOCRINE: Keep -180. Start statins and fenofibrate. Endocrinology evaluation.      MUSCULOSKELETAL: Out of bed.     DISPO: If needs insulin infusion then will be moved to ICU; otherwise can stay on the floor for now.  IMPRESSION:    Hypertriglyceridemia-induced pancreatitis  Hypovolemic hyponatremia  Hepatic steatosis  SIRS  Active smoker  HO alcohol use, pancreatitis    PLAN:    CNS: Avoid sedatives. Nicotine patch. Pain control.     HEENT: Oral care    PULMONARY: HOB @ 45 degrees. Aspiration precautions. Counseled patient on smoking cessation. CXR is normal.     CARDIOVASCULAR: Goal directed fluid resuscitation. Start LR at 150/hr. Avoid volume overload.     GI: GI prophylaxis. Feeding: Clear liquids. Advance diet as tolerated. Bowel regimen. Trend LFTs. US abdomen done with no evidence of Gallstones. GI evaluation.     RENAL: Follow up renal function and lytes. Correct as needed. Keep K>4    INFECTIOUS DISEASE: Monitor vital signs. Follow up cultures    HEMATOLOGICAL: DVT prophylaxis; keep hgb more than 7; check coags     ENDOCRINE: Keep -180. Start statins and fenofibrate. Endocrinology evaluation. Start insulin infusion 0.1u/kg/hr. If FS less than 200 then can switch to D5LR for IVF. DC insulin infusion once triglyceride level is less than 500. Check triglyceride level in 6h and 12 h.     MUSCULOSKELETAL: Out of bed.     DISPO: Will be transferred to ICU.

## 2023-01-05 NOTE — PROGRESS NOTE ADULT - ASSESSMENT
42-year-old male with history of pancreatitis, social drinker presents to the ED accompanied by sister complaining of bandlike abdominal pain, 8/10 radiation to the back that started yesterday afternoon. Patient states pain got worse today.    #Acute pancreatitis  #no sepsis on admission  - likely alcoholic, last drink was on new years hiren  - typical abdominal pain and CT findings  - CT abd/ pelvis:   1.  Findings compatible with acute interstitial edematous pancreatitis;   no evidence of necrosis or peripancreatic fluid collections.  2.  Hepatic steatosis and hepatomegaly.  - Lipase 125, WBC 14K  - RUQ U/S: CBD 4mm, hepatic steatosis  - continue with IVFs: LR @200cc/hr  - CLD for now, advance as tolerated  - Follow up ferritin, ceroluplasmin, lipid panel, a1c, tsh  - GI consult    #hyponatremia  - likely due to hypovolemia in the setting of pancreatitis  - continue with fluids  - serum and urin osm  - urine na and cr  - monitor BMP    #pruritic abdominal rash  - appears to be hives  - only medication he recently started was protonix which he took the day prior to coming to the hospital  - benadryl x 1  - unsure of related to protonix but will switch to famotidine for prophylaxis    #Hepatic steatosis with hepatomegaly  - RUQ U/S: hepatic steatosis  - fup ferritin, ceroluplasmin, lipid panel, a1c, tsh  - GI consult  - will need hepatology follow up outpatient    #Misc:  - DVT ppx: lovenox  - GI ppx: famotidine  - Code: FULL  - Activity: AAT  - Diet: CLD, advance as tolerated  - Dispo: from home  - Pending: pain control, food tolerance, GI consult         42-year-old male with history of pancreatitis, social drinker presents to the ED accompanied by sister complaining of bandlike abdominal pain, 8/10 radiation to the back that started yesterday afternoon. Patient states pain got worse today.    #Acute pancreatitis secondary to hypertriglyceridemia  #no sepsis on admission  - likely alcoholic, last drink was on new years hiren  - typical abdominal pain and CT findings  - CT abd/ pelvis:   1.  Findings compatible with acute interstitial edematous pancreatitis;   no evidence of necrosis or peripancreatic fluid collections.  2.  Hepatic steatosis and hepatomegaly.  - Lipase 125, WBC 14K  - RUQ U/S: CBD 4mm, hepatic steatosis  - continue with IVFs: LR @200cc/hr  - CLD for now, advance as tolerated  - Follow up ferritin, ceroluplasmin, lipid panel, a1c, tsh  - GI consult    #hyponatremia  - likely due to hypovolemia in the setting of pancreatitis  - continue with fluids  - serum and urin osm  - urine na and cr  - monitor BMP    #pruritic abdominal rash  - appears to be hives  - unsure of related to contrast  - benadryl x 1 dose    #Hepatic steatosis with hepatomegaly  - RUQ U/S: hepatic steatosis  - fup ferritin, ceroluplasmin, lipid panel, a1c, tsh  - GI consult  - will need hepatology follow up outpatient    #Misc:  - DVT ppx: lovenox  - GI ppx: famotidine  - Code: FULL  - Activity: AAT  - Diet: CLD, advance as tolerated  - Dispo: from home  - Pending: pain control, food tolerance, GI consult

## 2023-01-05 NOTE — H&P ADULT - HISTORY OF PRESENT ILLNESS
42-year-old male with history of pancreatitis, social drinker presents to the ED accompanied by sister complaining of bandlike abdominal pain, 8/10 radiation to the back that started yesterday afternoon. Patient states pain got worse today.  Patient attempted to take pantoprazole with no relief so came to the ED.    Pt is a social drinker and last drink was on new years hiren.    ED vitals:   / 86, HR 78, RR 18, O2 sat Temp 97.8, on RA satting well    Sig labs:   white count 14k, lipase 125    CT abd/ pelvis:   1.  Findings compatible with acute interstitial edematous pancreatitis;   no evidence of necrosis or peripancreatic fluid collections.  2.  Hepatic steatosis and hepatomegaly.    Pt given pain meds and fluids in the ED 42-year-old male with history of pancreatitis, social drinker presents to the ED accompanied by sister complaining of bandlike abdominal pain, 8/10 radiation to the back that started yesterday afternoon. Patient states pain got worse today.  Patient attempted to take pantoprazole with no relief so came to the ED.    Pt is a social drinker and last drink was on new years hiren.    ED vitals:   / 86, HR 78, RR 18, O2 sat Temp 97.8, on RA satting well    Sig labs:   white count 14k, lipase 125, Na 126    CT abd/ pelvis:   1.  Findings compatible with acute interstitial edematous pancreatitis;   no evidence of necrosis or peripancreatic fluid collections.  2.  Hepatic steatosis and hepatomegaly.    Pt given pain meds and fluids in the ED

## 2023-01-05 NOTE — PATIENT PROFILE ADULT - DOES PATIENT HAVE ADVANCE DIRECTIVE
Pre-Surgery Instructions:   Medication Instructions    amLODIPine (NORVASC) 5 mg tablet take day of surgery    lisinopril (ZESTRIL) 20 mg tablet hold day of surgery    tamsulosin (FLOMAX) 0 4 mg hold day of surgery    My Surgical Experience    The following information was developed to assist you to prepare for your operation  What do I need to do before coming to the hospital?   Arrange for a responsible person to drive you to and from the hospital    Arrange care for your children at home  Children are not allowed in the recovery areas of the hospital   Plan to wear clothing that is easy to put on and take off  If you are having shoulder surgery, wear a shirt that buttons or zippers in the front  Bathing  o Shower the evening before and the morning of your surgery with an antibacterial soap  Please refer to the Pre Op Showering Instructions for Surgery Patients Sheet   o Remove nail polish and all body piercing jewelry  o Do not shave any body part for at least 24 hours before surgery-this includes face, arms, legs and upper body  Food  o Nothing to eat or drink after midnight the night before your surgery  This includes candy and chewing gum  o Exception: If your surgery is after 12:00pm (noon), you may have clear liquids such as 7-Up®, ginger ale, apple or cranberry juice, Jell-O®, water, or clear broth until 8:00 am  o Do not drink milk or juice with pulp on the morning before surgery  o Do not drink alcohol 24 hours before surgery  Medicine  o Follow instructions you received from your surgeon about which medicines you may take on the day of surgery  o If instructed to take medicine on the morning of surgery, take pills with just a small sip of water   Call your prescribing doctor for specific infroamtion on what to do if you take insulin    What should I bring to the hospital?    Bring:  Elridcal Johnson or a walker, if you have them, for foot or knee surgery   A list of the daily medicines, vitamins, minerals, herbals and nutritional supplements you take  Include the dosages of medicines and the time you take them each day   Glasses, dentures or hearing aids   Minimal clothing; you will be wearing hospital sleepwear   Photo ID; required to verify your identity   If you have a Living Will or Power of , bring a copy of the documents   If you have an ostomy, bring an extra pouch and any supplies you use    Do not bring   Medicines or inhalers   Money, valuables or jewelry    What other information should I know about the day of surgery?  Notify your surgeons if you develop a cold, sore throat, cough, fever, rash or any other illness   Report to the Ambulatory Surgical/Same Day Surgery Unit   You will be instructed to stop at Registration only if you have not been pre-registered   Inform your  fi they do not stay that they will be asked by the staff to leave a phone number where they can be reached   Be available to be reached before surgery  In the event the operating room schedule changes, you may be asked to come in earlier or later than expected    *It is important to tell your doctor and others involved in your health care if you are taking or have been taking any non-prescription drugs, vitamins, minerals, herbals or other nutritional supplements   Any of these may interact with some food or medicines and cause a reaction No

## 2023-01-05 NOTE — PATIENT PROFILE ADULT - FUNCTIONAL ASSESSMENT - BASIC MOBILITY 6.
4-calculated by average/Not able to assess (calculate score using [facility] averaging method)

## 2023-01-05 NOTE — CONSULT NOTE ADULT - SUBJECTIVE AND OBJECTIVE BOX
Gastroenterology Consultation:    Patient is a 42y old  Male who presents with a chief complaint of       Admitted on: 01-04-23      HPI:  42-year-old male with history of pancreatitis, social drinker presents to the ED accompanied by sister complaining of bandlike abdominal pain, 8/10 radiation to the back that started yesterday afternoon. Patient states pain got worse today.  Patient attempted to take pantoprazole with no relief so came to the ED. Pt is a social drinker and last drink was on new years hiren.  ED vitals:  / 86, HR 78, RR 18, O2 sat Temp 97.8, on RA satting well  Sig labs: white count 14k, lipase 125, Na 126  CT abd/ pelvis: 1.  Findings compatible with acute interstitial edematous pancreatitis; no evidence of necrosis or peripancreatic fluid collections. 2.  Hepatic steatosis and hepatomegaly.  GI is called for evaluation. recently moved to the . had prior pancreatitis 6 years ago unknown etiology. does not drink per the sister.        Prior EGD: none on chart    Prior Colonoscopy: none on chart       PAST MEDICAL & SURGICAL HISTORY:        FAMILY HISTORY:  no gi malignancy     Social History:  Tobacco: denies  Alcohol: rarely drinks 1 drink per month   Drugs: denies    Home Medications:        MEDICATIONS  (STANDING):  atorvastatin 80 milliGRAM(s) Oral at bedtime  dextrose 50% Injectable 50 milliLiter(s) IV Push every 15 minutes  enoxaparin Injectable 40 milliGRAM(s) SubCutaneous every 24 hours  fenofibrate Tablet 145 milliGRAM(s) Oral daily  insulin regular Infusion 10 Unit(s)/Hr (10 mL/Hr) IV Continuous <Continuous>  lactated ringers 1000 milliLiter(s) (150 mL/Hr) IV Continuous <Continuous>  nicotine -  14 mG/24Hr(s) Patch 1 Patch Transdermal daily    MEDICATIONS  (PRN):  acetaminophen     Tablet .. 650 milliGRAM(s) Oral every 6 hours PRN Temp greater or equal to 38C (100.4F), Mild Pain (1 - 3)  morphine  - Injectable 2 milliGRAM(s) IV Push every 6 hours PRN Severe Pain (7 - 10)  ondansetron Injectable 4 milliGRAM(s) IV Push every 8 hours PRN Nausea and/or Vomiting      Allergies  No Known Allergies      Review of Systems:   Constitutional:  No Fever, No Chills  ENT/Mouth:  No Hearing Changes,  No Difficulty Swallowing  Eyes:  No Eye Pain, No Vision Changes  Cardiovascular:  No Chest Pain, No Palpitations  Respiratory:  No Cough, No Dyspnea  Gastrointestinal:  As described in HPI  Musculoskeletal:  No Joint Swelling, No Back Pain  Skin:  No Skin Lesions, No Jaundice  Neuro:  No Syncope, No Dizziness  Heme/Lymph:  No Bruising, No Bleeding.          Physical Examination:  T(C): 37.9 (01-05-23 @ 17:08), Max: 38.2 (01-05-23 @ 15:23)  HR: 105 (01-05-23 @ 15:23) (92 - 115)  BP: 120/71 (01-05-23 @ 15:23) (110/74 - 135/72)  RR: 18 (01-05-23 @ 15:23) (18 - 19)  SpO2: 94% (01-05-23 @ 08:01) (94% - 94%)    Weight (kg): 99.1 (01-05-23 @ 01:00)    01-05-23 @ 07:01  -  01-05-23 @ 19:24  --------------------------------------------------------  IN: 3 mL / OUT: 0 mL / NET: 3 mL          GENERAL: AAOx3, no acute distress.  HEAD:  Atraumatic, Normocephalic  EYES: conjunctiva and sclera clear  NECK: Supple, no JVD or thyromegaly  CHEST/LUNG: Clear to auscultation bilaterally; No wheeze, rhonchi, or rales  HEART: Regular rate and rhythm; normal S1, S2, No murmurs.  ABDOMEN: Soft, nontender, nondistended; Bowel sounds present  NEUROLOGY: No asterixis or tremor.   SKIN: Intact, no jaundice        Data:                        15.2   14.09 )-----------( 164      ( 05 Jan 2023 08:42 )             43.5     Hgb Trend:  15.2  01-05-23 @ 08:42  14.0  01-04-23 @ 17:55        01-05    133<L>  |  101  |  6<L>  ----------------------------<  208<H>  3.7   |  24  |  0.7    Ca    8.2<L>      05 Jan 2023 14:26  Mg     1.8     01-05    TPro  5.8<L>  /  Alb  3.3<L>  /  TBili  1.5<H>  /  DBili  x   /  AST  9   /  ALT  20  /  AlkPhos  81  01-05    Liver panel trend:  TBili 1.5   /   AST 9   /   ALT 20   /   AlkP 81   /   Tptn 5.8   /   Alb 3.3    /   DBili --      01-05  TBili 1.5   /   AST <5   /   ALT <5   /   AlkP 90   /   Tptn 6.5   /   Alb 3.7    /   DBili --      01-05  TBili 1.4   /   AST 5   /   ALT 5   /   AlkP 87   /   Tptn 7.4   /   Alb 4.3    /   DBili 0.2      01-04      PT/INR - ( 05 Jan 2023 14:26 )   PT: 11.70 sec;   INR: 1.03 ratio         PTT - ( 05 Jan 2023 14:26 )  PTT:32.8 sec        Radiology:    US Abdomen Upper Quadrant Right:   ACC: [id number] EXAM:  US ABDOMEN RT UPR QUADRANT                          PROCEDURE DATE:  01/05/2023          INTERPRETATION:  CLINICAL INFORMATION: Abdominal pain. Findings   compatible with acute pancreatitis on recent CT abdomen and pelvis.    COMPARISON: CT abdomen and pelvis 1/4/2023.    TECHNIQUE: Sonography of the right upper quadrant.    FINDINGS:  Liver: Increased echogenicity.  Bile ducts: Normal caliber. Common bile duct measures 4 mm.  Gallbladder: Within normal limits.  Pancreas: Poorly visualized.  Right kidney: 10.1 cm. No hydronephrosis.  Ascites: None.  IVC: Visualized portions are within normal limits.    IMPRESSION:  Hepatic steatosis.    Poor visualization of the pancreas, please see concurrent CT scan   performed prior day.        --- End of Report ---          LAURA ZAMORANO MD; Resident Radiologist  This document has been electronically signed.  JOHANA ALVES MD; Attending Interventional Radiologist  This document has been electronically signed. Jan 5 2023  5:05PM (01-05-23 @ 08:23)     Gastroenterology Consultation:    Patient is a 42y old  Male who presents with a chief complaint of abdominal pain      Admitted on: 01-04-23      HPI:  42-year-old male with history of pancreatitis, social drinker presents to the ED accompanied by sister complaining of bandlike abdominal pain, 8/10 radiation to the back that started yesterday afternoon. Patient states pain got worse today.  Patient attempted to take pantoprazole with no relief so came to the ED. Pt is a social drinker and last drink was on new years hiren.  ED vitals:  / 86, HR 78, RR 18, O2 sat Temp 97.8, on RA satting well  Sig labs: white count 14k, lipase 125, Na 126  CT abd/ pelvis: 1.  Findings compatible with acute interstitial edematous pancreatitis; no evidence of necrosis or peripancreatic fluid collections. 2.  Hepatic steatosis and hepatomegaly.  GI is called for evaluation. recently moved to the . had prior pancreatitis 6 years ago unknown etiology. does not drink per the sister.        Prior EGD: none on chart    Prior Colonoscopy: none on chart       PAST MEDICAL & SURGICAL HISTORY:        FAMILY HISTORY:  no gi malignancy     Social History:  Tobacco: denies  Alcohol: rarely drinks 1 drink per month   Drugs: denies    Home Medications:        MEDICATIONS  (STANDING):  atorvastatin 80 milliGRAM(s) Oral at bedtime  dextrose 50% Injectable 50 milliLiter(s) IV Push every 15 minutes  enoxaparin Injectable 40 milliGRAM(s) SubCutaneous every 24 hours  fenofibrate Tablet 145 milliGRAM(s) Oral daily  insulin regular Infusion 10 Unit(s)/Hr (10 mL/Hr) IV Continuous <Continuous>  lactated ringers 1000 milliLiter(s) (150 mL/Hr) IV Continuous <Continuous>  nicotine -  14 mG/24Hr(s) Patch 1 Patch Transdermal daily    MEDICATIONS  (PRN):  acetaminophen     Tablet .. 650 milliGRAM(s) Oral every 6 hours PRN Temp greater or equal to 38C (100.4F), Mild Pain (1 - 3)  morphine  - Injectable 2 milliGRAM(s) IV Push every 6 hours PRN Severe Pain (7 - 10)  ondansetron Injectable 4 milliGRAM(s) IV Push every 8 hours PRN Nausea and/or Vomiting      Allergies  No Known Allergies      Review of Systems:   Constitutional:  No Fever, No Chills  ENT/Mouth:  No Hearing Changes,  No Difficulty Swallowing  Eyes:  No Eye Pain, No Vision Changes  Cardiovascular:  No Chest Pain, No Palpitations  Respiratory:  No Cough, No Dyspnea  Gastrointestinal:  As described in HPI  Musculoskeletal:  No Joint Swelling, No Back Pain  Skin:  No Skin Lesions, No Jaundice  Neuro:  No Syncope, No Dizziness  Heme/Lymph:  No Bruising, No Bleeding.          Physical Examination:  T(C): 37.9 (01-05-23 @ 17:08), Max: 38.2 (01-05-23 @ 15:23)  HR: 105 (01-05-23 @ 15:23) (92 - 115)  BP: 120/71 (01-05-23 @ 15:23) (110/74 - 135/72)  RR: 18 (01-05-23 @ 15:23) (18 - 19)  SpO2: 94% (01-05-23 @ 08:01) (94% - 94%)    Weight (kg): 99.1 (01-05-23 @ 01:00)    01-05-23 @ 07:01  -  01-05-23 @ 19:24  --------------------------------------------------------  IN: 3 mL / OUT: 0 mL / NET: 3 mL          GENERAL: AAOx3, no acute distress.  HEAD:  Atraumatic, Normocephalic  EYES: conjunctiva and sclera clear  NECK: Supple, no JVD or thyromegaly  CHEST/LUNG: Clear to auscultation bilaterally; No wheeze, rhonchi, or rales  HEART: Regular rate and rhythm; normal S1, S2, No murmurs.  ABDOMEN: Soft, nontender, nondistended; Bowel sounds present  NEUROLOGY: No asterixis or tremor.   SKIN: Intact, no jaundice        Data:                        15.2   14.09 )-----------( 164      ( 05 Jan 2023 08:42 )             43.5     Hgb Trend:  15.2  01-05-23 @ 08:42  14.0  01-04-23 @ 17:55        01-05    133<L>  |  101  |  6<L>  ----------------------------<  208<H>  3.7   |  24  |  0.7    Ca    8.2<L>      05 Jan 2023 14:26  Mg     1.8     01-05    TPro  5.8<L>  /  Alb  3.3<L>  /  TBili  1.5<H>  /  DBili  x   /  AST  9   /  ALT  20  /  AlkPhos  81  01-05    Liver panel trend:  TBili 1.5   /   AST 9   /   ALT 20   /   AlkP 81   /   Tptn 5.8   /   Alb 3.3    /   DBili --      01-05  TBili 1.5   /   AST <5   /   ALT <5   /   AlkP 90   /   Tptn 6.5   /   Alb 3.7    /   DBili --      01-05  TBili 1.4   /   AST 5   /   ALT 5   /   AlkP 87   /   Tptn 7.4   /   Alb 4.3    /   DBili 0.2      01-04      PT/INR - ( 05 Jan 2023 14:26 )   PT: 11.70 sec;   INR: 1.03 ratio         PTT - ( 05 Jan 2023 14:26 )  PTT:32.8 sec        Radiology:    US Abdomen Upper Quadrant Right:   ACC: [id number] EXAM:  US ABDOMEN RT UPR QUADRANT                          PROCEDURE DATE:  01/05/2023          INTERPRETATION:  CLINICAL INFORMATION: Abdominal pain. Findings   compatible with acute pancreatitis on recent CT abdomen and pelvis.    COMPARISON: CT abdomen and pelvis 1/4/2023.    TECHNIQUE: Sonography of the right upper quadrant.    FINDINGS:  Liver: Increased echogenicity.  Bile ducts: Normal caliber. Common bile duct measures 4 mm.  Gallbladder: Within normal limits.  Pancreas: Poorly visualized.  Right kidney: 10.1 cm. No hydronephrosis.  Ascites: None.  IVC: Visualized portions are within normal limits.    IMPRESSION:  Hepatic steatosis.    Poor visualization of the pancreas, please see concurrent CT scan   performed prior day.        --- End of Report ---          LAURA ZAMORANO MD; Resident Radiologist  This document has been electronically signed.  JOHANA ALVES MD; Attending Interventional Radiologist  This document has been electronically signed. Jan 5 2023  5:05PM (01-05-23 @ 08:23)     Gastroenterology Consultation:    Patient is a 42y old  Male who presents with a chief complaint of abdominal pain      Admitted on: 01-04-23      HPI:  42-year-old male with history of pancreatitis, social drinker presents to the ED accompanied by sister complaining of bandlike abdominal pain, 8/10 radiation to the back that started yesterday afternoon. Patient states pain got worse today.  Patient attempted to take pantoprazole with no relief so came to the ED. Pt is a social drinker and last drink was on new years hiren.  ED vitals:  / 86, HR 78, RR 18, O2 sat Temp 97.8, on RA satting well  Sig labs: white count 14k, lipase 125, Na 126  CT abd/ pelvis: 1.  Findings compatible with acute interstitial edematous pancreatitis; no evidence of necrosis or peripancreatic fluid collections. 2.  Hepatic steatosis and hepatomegaly.  GI is called for evaluation. recently moved to the . had prior pancreatitis 6 years ago unknown etiology. does not drink per the sister.        Prior EGD: none on chart    Prior Colonoscopy: none on chart       PAST MEDICAL & SURGICAL HISTORY:        FAMILY HISTORY:  no gi malignancy     Social History:  Tobacco: denies  Alcohol: rarely drinks 1 drink per month   Drugs: denies    Home Medications:        MEDICATIONS  (STANDING):  atorvastatin 80 milliGRAM(s) Oral at bedtime  dextrose 50% Injectable 50 milliLiter(s) IV Push every 15 minutes  enoxaparin Injectable 40 milliGRAM(s) SubCutaneous every 24 hours  fenofibrate Tablet 145 milliGRAM(s) Oral daily  insulin regular Infusion 10 Unit(s)/Hr (10 mL/Hr) IV Continuous <Continuous>  lactated ringers 1000 milliLiter(s) (150 mL/Hr) IV Continuous <Continuous>  nicotine -  14 mG/24Hr(s) Patch 1 Patch Transdermal daily    MEDICATIONS  (PRN):  acetaminophen     Tablet .. 650 milliGRAM(s) Oral every 6 hours PRN Temp greater or equal to 38C (100.4F), Mild Pain (1 - 3)  morphine  - Injectable 2 milliGRAM(s) IV Push every 6 hours PRN Severe Pain (7 - 10)  ondansetron Injectable 4 milliGRAM(s) IV Push every 8 hours PRN Nausea and/or Vomiting      Allergies  No Known Allergies      Review of Systems:   Constitutional:  No Fever, No Chills  ENT/Mouth:  No Hearing Changes,  No Difficulty Swallowing  Eyes:  No Eye Pain, No Vision Changes  Cardiovascular:  No Chest Pain, No Palpitations  Respiratory:  No Cough, No Dyspnea  Gastrointestinal:  As described in HPI  Musculoskeletal:  No Joint Swelling, No Back Pain  Skin:  No Skin Lesions, No Jaundice  Neuro:  No Syncope, No Dizziness  Heme/Lymph:  No Bruising, No Bleeding.          Physical Examination:  T(C): 37.9 (01-05-23 @ 17:08), Max: 38.2 (01-05-23 @ 15:23)  HR: 105 (01-05-23 @ 15:23) (92 - 115)  BP: 120/71 (01-05-23 @ 15:23) (110/74 - 135/72)  RR: 18 (01-05-23 @ 15:23) (18 - 19)  SpO2: 94% (01-05-23 @ 08:01) (94% - 94%)    Weight (kg): 99.1 (01-05-23 @ 01:00)    01-05-23 @ 07:01  -  01-05-23 @ 19:24  --------------------------------------------------------  IN: 3 mL / OUT: 0 mL / NET: 3 mL          GENERAL: AAOx3, no acute distress.  HEAD:  Atraumatic, Normocephalic  EYES: conjunctiva and sclera clear  NECK: Supple, no JVD or thyromegaly  CHEST/LUNG: Clear to auscultation bilaterally; No wheeze, rhonchi, or rales  HEART: Regular rate and rhythm; normal S1, S2, No murmurs.  ABDOMEN: Soft, nontender, nondistended; Bowel sounds present  NEUROLOGY: No asterixis or tremor.   SKIN: Intact, no jaundice        Data:                        15.2   14.09 )-----------( 164      ( 05 Jan 2023 08:42 )             43.5     Hgb Trend:  15.2  01-05-23 @ 08:42  14.0  01-04-23 @ 17:55        01-05    133<L>  |  101  |  6<L>  ----------------------------<  208<H>  3.7   |  24  |  0.7    Ca    8.2<L>      05 Jan 2023 14:26  Mg     1.8     01-05    TPro  5.8<L>  /  Alb  3.3<L>  /  TBili  1.5<H>  /  DBili  x   /  AST  9   /  ALT  20  /  AlkPhos  81  01-05    Liver panel trend:  TBili 1.5   /   AST 9   /   ALT 20   /   AlkP 81   /   Tptn 5.8   /   Alb 3.3    /   DBili --      01-05  TBili 1.5   /   AST <5   /   ALT <5   /   AlkP 90   /   Tptn 6.5   /   Alb 3.7    /   DBili --      01-05  TBili 1.4   /   AST 5   /   ALT 5   /   AlkP 87   /   Tptn 7.4   /   Alb 4.3    /   DBili 0.2      01-04      PT/INR - ( 05 Jan 2023 14:26 )   PT: 11.70 sec;   INR: 1.03 ratio         PTT - ( 05 Jan 2023 14:26 )  PTT:32.8 sec        Radiology:    US Abdomen Upper Quadrant Right:   ACC: [id number] EXAM:  US ABDOMEN RT UPR QUADRANT                          PROCEDURE DATE:  01/05/2023          INTERPRETATION:  CLINICAL INFORMATION: Abdominal pain. Findings   compatible with acute pancreatitis on recent CT abdomen and pelvis.    COMPARISON: CT abdomen and pelvis 1/4/2023.    TECHNIQUE: Sonography of the right upper quadrant.      FINDINGS:  Liver: Increased echogenicity.  Bile ducts: Normal caliber. Common bile duct measures 4 mm.  Gallbladder: Within normal limits.  Pancreas: Poorly visualized.  Right kidney: 10.1 cm. No hydronephrosis.  Ascites: None.  IVC: Visualized portions are within normal limits.    IMPRESSION:  Hepatic steatosis.    Poor visualization of the pancreas, please see concurrent CT scan   performed prior day.    --- End of Report ---  LAURA ZAMORANO MD; Resident Radiologist  This document has been electronically signed.  JOHANA ALVES MD; Attending Interventional Radiologist  This document has been electronically signed. Jan 5 2023  5:05PM (01-05-23 @ 08:23)      < from: CT Abdomen and Pelvis w/ IV Cont (01.04.23 @ 18:00) >  1.  Findings compatible with acute interstitial edematous pancreatitis;   no evidence of necrosis or peripancreatic fluid collections.  2.  Hepatic steatosis and hepatomegaly.    < end of copied text >

## 2023-01-05 NOTE — CONSULT NOTE ADULT - SUBJECTIVE AND OBJECTIVE BOX
Patient is a 42y old  Male who presents with a chief complaint of     HPI:  42-year-old male with history of pancreatitis, social drinker presents to the ED accompanied by sister complaining of bandlike abdominal pain, 8/10 radiation to the back that started yesterday afternoon. Patient states pain got worse today.  Patient attempted to take pantoprazole with no relief so came to the ED.    Pt is a social drinker and last drink was on new years hiren.    ED vitals:   / 86, HR 78, RR 18, O2 sat Temp 97.8, on RA satting well    Sig labs:   white count 14k, lipase 125, Na 126    CT abd/ pelvis:   1.  Findings compatible with acute interstitial edematous pancreatitis;   no evidence of necrosis or peripancreatic fluid collections.  2.  Hepatic steatosis and hepatomegaly.    Pt given pain meds and fluids in the ED (2023 00:31)      PAST MEDICAL & SURGICAL HISTORY:      Occupational hx:    Social hx:    FAMILY HISTORY:  :  No known cardiovascular family history    ROS:  See HPI     Allergies    No Known Allergies    Intolerances          PHYSICAL EXAM    ICU Vital Signs Last 24 Hrs  T(C): 37.6 (2023 08:01), Max: 37.9 (2023 01:00)  T(F): 99.7 (2023 08:01), Max: 100.3 (2023 01:00)  HR: 107 (2023 08:01) (78 - 115)  BP: 116/58 (2023 08:01) (110/74 - 135/86)  BP(mean): 88 (2023 01:00) (88 - 88)  ABP: --  ABP(mean): --  RR: 19 (2023 08:01) (18 - 19)  SpO2: 94% (2023 08:01) (94% - 99%)    O2 Parameters below as of 2023 08:01  Patient On (Oxygen Delivery Method): room air            CONSTITUTIONAL: No acute distress, well-developed, well-groomed, AAOx3  HEAD: Atraumatic, normocephalic  EYES: EOM intact, PERRLA, conjunctiva and sclera clear  ENT: Supple, no masses, no thyromegaly, no bruits, no JVD; moist mucous membranes  PULMONARY: Clear to auscultation bilaterally; no wheezes, rales, or rhonchi  CARDIOVASCULAR: Regular rate and rhythm; no murmurs, rubs, or gallops  GASTROINTESTINAL: Soft, non-tender, non-distended; bowel sounds present  MUSCULOSKELETAL: 2+ peripheral pulses; no clubbing, no cyanosis, no edema  NEUROLOGY: non-focal  SKIN: No rashes or lesions; warm and dry        LABS:                          15.2   14.09 )-----------( 164      ( 2023 08:42 )             43.5                                               01    131<L>  |  99  |  7<L>  ----------------------------<  229<H>  3.8   |  24  |  0.8    Ca    8.5      2023 08:42  Mg     1.8         TPro  6.5  /  Alb  3.7  /  TBili  1.5<H>  /  DBili  x   /  AST  x   /  ALT  x   /  AlkPhos  90                                               Urinalysis Basic - ( 2023 19:35 )    Color: Yellow / Appearance: Clear / S.047 / pH: x  Gluc: x / Ketone: Small  / Bili: Small / Urobili: 3 mg/dL   Blood: x / Protein: 30 mg/dL / Nitrite: Negative   Leuk Esterase: Negative / RBC: 2 /HPF / WBC 2 /HPF   Sq Epi: x / Non Sq Epi: 2 /HPF / Bacteria: Negative        CARDIAC MARKERS ( 2023 17:55 )  x     / <0.01 ng/mL / x     / x     / x                                                LIVER FUNCTIONS - ( 2023 08:42 )  Alb: 3.7 g/dL / Pro: 6.5 g/dL / ALK PHOS: 90 U/L / ALT: x     / AST: x     / GGT: x                                                                                                                                       CXR reviewed by myself    MEDICATIONS  (STANDING):  dextrose 50% Injectable 50 milliLiter(s) IV Push every 15 minutes  diphenhydrAMINE 25 milliGRAM(s) Oral once  enoxaparin Injectable 40 milliGRAM(s) SubCutaneous every 24 hours  famotidine    Tablet 20 milliGRAM(s) Oral two times a day  insulin regular Infusion 10 Unit(s)/Hr (10 mL/Hr) IV Continuous <Continuous>  lactated ringers 1000 milliLiter(s) (200 mL/Hr) IV Continuous <Continuous>  nicotine -  14 mG/24Hr(s) Patch 1 Patch Transdermal daily    MEDICATIONS  (PRN):  acetaminophen     Tablet .. 650 milliGRAM(s) Oral every 6 hours PRN Temp greater or equal to 38C (100.4F), Mild Pain (1 - 3)  morphine  - Injectable 2 milliGRAM(s) IV Push every 6 hours PRN Severe Pain (7 - 10)  ondansetron Injectable 4 milliGRAM(s) IV Push every 8 hours PRN Nausea and/or Vomiting

## 2023-01-05 NOTE — H&P ADULT - NSHPLABSRESULTS_GEN_ALL_CORE
LABS:                          14.0   14.98 )-----------( 220      ( 04 Jan 2023 17:55 )             42.4     01-04    126<L>  |  95<L>  |  7<L>  ----------------------------<  207<H>  6.9<HH>   |  23  |  0.8    Ca    8.2<L>      04 Jan 2023 17:55    TPro  7.4  /  Alb  4.3  /  TBili  1.4<H>  /  DBili  0.2  /  AST  5   /  ALT  5   /  AlkPhos  87  01-04

## 2023-01-05 NOTE — H&P ADULT - ATTENDING COMMENTS
41 YO M w/ a PMH of pancreatitis who presents to the hospital w/ a c/o ABD pain for the past x 2 days. Described as located in the epigastric area, radiating to back, sharp, and waxes/wanes. - N/V. Denies any fevers/chills, hematemesis, black/bloody stools, rashes, flank pain, dysuria, LE swelling, rashes, or CP. ROS negative except as stated above.     In the ED, CT-AP w/ IV contrast showed acute interstitial pancreatitis. Pt started on IVFs (LR), anti-emetics, and pain meds in the ED.     FMHx:   -No family Hx of early cardiac death, CAD, asthma, or genetic disorders identified    SHX:  -Drinks socially, last drink was on [employer]TE    Physical exam shows pt laying in bed in NAD. VSS, afebrile, not hypoxic on RA. A&Ox3. Neuro exam intact. CTA B/L with no W/C/R. RRR, no M/G/R. ABD is soft with TTP in the epigastric region, normoactive BSs. LEs with no pitting edema. No rashes. Labs and radiology as above.     Epigastric ABD pain due to acute pancreatitis, no sepsis present on admission. Send lipid panel. RUQ US to rule out obstruction. IVFs (LR). PRN pain meds. PRN anti-emetics.     Elevated glucose. A1c. FSs. Insulin standing/correctional dosing    Hyponatremia. Send urine/serum osmo and urine lytes. TSH. Serial BMPs. Do not over correct (<8-10 in 24 hours).     Restart home meds, except as stated above. DVT PPX. Inform PCP of pt's admission to hospital. My note supersedes the residents note.     Date seen by [email or web address]

## 2023-01-05 NOTE — CHART NOTE - NSCHARTNOTEFT_GEN_A_CORE
Transfer from:  (x) Medicine    (  ) Telemetry    (  ) RCU    (  ) Palliative    (  ) Stroke Unit    (  ) _______________  Transfer to: MICU  Accepting Physician: Dr. Kumar    HPI:  42-year-old male with history of pancreatitis, social drinker presents to the ED accompanied by sister complaining of bandlike abdominal pain, 8/10 radiation to the back that started yesterday afternoon. Patient states pain got worse today.  Patient attempted to take pantoprazole with no relief so came to the ED.    Pt is a social drinker and last drink was on new years hiren.    ED vitals:   / 86, HR 78, RR 18, O2 sat Temp 97.8, on RA satting well    Sig labs:   white count 14k, lipase 125, Na 126    CT abd/ pelvis:   1.  Findings compatible with acute interstitial edematous pancreatitis;   no evidence of necrosis or peripancreatic fluid collections.  2.  Hepatic steatosis and hepatomegaly.    Pt given pain meds and fluids in the ED      3A Course:  Lipid panel came back and found to have TG of 2411. ICU fellow called and approved for upgrade to triglyceridemia induced pancreatitis. Insulin gtt ordered. Potassium additive added to fluids.     To-Do:    [x] monitor BMP around the clock   [x] monitor FS q1h and adjust gtt accordingly   [x] Follow up blood cultures    OBJECTIVE --  Vital Signs Last 24 Hrs  T(C): 37.6 (05 Jan 2023 08:01), Max: 37.9 (05 Jan 2023 01:00)  T(F): 99.7 (05 Jan 2023 08:01), Max: 100.3 (05 Jan 2023 01:00)  HR: 107 (05 Jan 2023 08:01) (78 - 115)  BP: 116/58 (05 Jan 2023 08:01) (110/74 - 135/86)  BP(mean): 88 (05 Jan 2023 01:00) (88 - 88)  RR: 19 (05 Jan 2023 08:01) (18 - 19)  SpO2: 94% (05 Jan 2023 08:01) (94% - 99%)    Parameters below as of 05 Jan 2023 08:01  Patient On (Oxygen Delivery Method): room air        I&O's Summary      MEDICATIONS  (STANDING):  dextrose 50% Injectable 50 milliLiter(s) IV Push every 15 minutes  diphenhydrAMINE 25 milliGRAM(s) Oral once  enoxaparin Injectable 40 milliGRAM(s) SubCutaneous every 24 hours  famotidine    Tablet 20 milliGRAM(s) Oral two times a day  insulin regular Infusion 10 Unit(s)/Hr (10 mL/Hr) IV Continuous <Continuous>  lactated ringers 1000 milliLiter(s) (200 mL/Hr) IV Continuous <Continuous>  nicotine -  14 mG/24Hr(s) Patch 1 Patch Transdermal daily    MEDICATIONS  (PRN):  acetaminophen     Tablet .. 650 milliGRAM(s) Oral every 6 hours PRN Temp greater or equal to 38C (100.4F), Mild Pain (1 - 3)  morphine  - Injectable 2 milliGRAM(s) IV Push every 6 hours PRN Severe Pain (7 - 10)  ondansetron Injectable 4 milliGRAM(s) IV Push every 8 hours PRN Nausea and/or Vomiting        LABS                                            15.2                  Neurophils% (auto):   70.3   (01-05 @ 08:42):    14.09)-----------(164          Lymphocytes% (auto):  23.5                                          43.5                   Eosinphils% (auto):   0.6      Manual%: Neutrophils x    ; Lymphocytes x    ; Eosinophils x    ; Bands%: x    ; Blasts x                                    131    |  99     |  7                   Calcium: 8.5   / iCa: x      (01-05 @ 08:42)    ----------------------------<  229       Magnesium: 1.8                              3.8     |  24     |  0.8              Phosphorous: x        TPro  6.5    /  Alb  3.7    /  TBili  1.5    /  DBili  x      /  AST  x      /  ALT  x      /  AlkPhos  90     05 Jan 2023 08:42            ASSESSMENT & PLAN:   42-year-old male with history of pancreatitis, social drinker presents to the ED accompanied by sister complaining of bandlike abdominal pain, 8/10 radiation to the back that started yesterday afternoon. Patient states pain got worse today.    #Acute pancreatitis secondary to hypertriglyceridemia  #no sepsis on admission  - likely alcoholic, last drink was on new years hiren  - typical abdominal pain and CT findings  - CT abd/ pelvis:   1.  Findings compatible with acute interstitial edematous pancreatitis;   no evidence of necrosis or peripancreatic fluid collections.  2.  Hepatic steatosis and hepatomegaly.  - Lipase 125, WBC 14K  - RUQ U/S: CBD 4mm, hepatic steatosis  - continue with IVFs: LR @200cc/hr with potassium additive  - CLD for now, advance as tolerated  - Follow up ferritin, ceroluplasmin, lipid panel, a1c, tsh  - GI consult    #hyponatremia  - likely due to hypovolemia in the setting of pancreatitis  - continue with fluids  - serum and urin osm  - urine na and cr  - monitor BMP    #pruritic abdominal rash  - appears to be hives  - unsure of related to contrast  - benadryl x 1 dose    #Hepatic steatosis with hepatomegaly  - RUQ U/S: hepatic steatosis  - fup ferritin, ceroluplasmin, lipid panel, a1c, tsh  - GI consult  - will need hepatology follow up outpatient    #Misc:  - DVT ppx: lovenox  - GI ppx: protonix  - Code: FULL  - Activity: AAT  - Diet: CLD, advance as tolerated  - Dispo: from home  - Pending: pain control, food tolerance, GI consult        For Follow-Up: Transfer from:  (x) Medicine    (  ) Telemetry    (  ) RCU    (  ) Palliative    (  ) Stroke Unit    (  ) _______________  Transfer to: MICU  Accepting Physician: Dr. Kumar    HPI:  42-year-old male with history of pancreatitis, social drinker presents to the ED accompanied by sister complaining of bandlike abdominal pain, 8/10 radiation to the back that started yesterday afternoon. Patient states pain got worse today.  Patient attempted to take pantoprazole with no relief so came to the ED.    Pt is a social drinker and last drink was on new years hiren.    ED vitals:   / 86, HR 78, RR 18, O2 sat Temp 97.8, on RA satting well    Sig labs:   white count 14k, lipase 125, Na 126    CT abd/ pelvis:   1.  Findings compatible with acute interstitial edematous pancreatitis;   no evidence of necrosis or peripancreatic fluid collections.  2.  Hepatic steatosis and hepatomegaly.    Pt given pain meds and fluids in the ED      3A Course:  Lipid panel came back and found to have TG of 2411. ICU fellow called and approved for upgrade to triglyceridemia induced pancreatitis. Insulin gtt ordered. Potassium additive added to fluids.     To-Do:    [x] monitor BMP around the clock   [x] TG q12  [x] monitor FS q1h and adjust insulin gtt accordingly   [x] Follow up blood cultures, urine cultures  [x] pain control      OBJECTIVE --  Vital Signs Last 24 Hrs  T(C): 37.6 (05 Jan 2023 08:01), Max: 37.9 (05 Jan 2023 01:00)  T(F): 99.7 (05 Jan 2023 08:01), Max: 100.3 (05 Jan 2023 01:00)  HR: 107 (05 Jan 2023 08:01) (78 - 115)  BP: 116/58 (05 Jan 2023 08:01) (110/74 - 135/86)  BP(mean): 88 (05 Jan 2023 01:00) (88 - 88)  RR: 19 (05 Jan 2023 08:01) (18 - 19)  SpO2: 94% (05 Jan 2023 08:01) (94% - 99%)    Parameters below as of 05 Jan 2023 08:01  Patient On (Oxygen Delivery Method): room air        I&O's Summary      MEDICATIONS  (STANDING):  dextrose 50% Injectable 50 milliLiter(s) IV Push every 15 minutes  diphenhydrAMINE 25 milliGRAM(s) Oral once  enoxaparin Injectable 40 milliGRAM(s) SubCutaneous every 24 hours  famotidine    Tablet 20 milliGRAM(s) Oral two times a day  insulin regular Infusion 10 Unit(s)/Hr (10 mL/Hr) IV Continuous <Continuous>  lactated ringers 1000 milliLiter(s) (200 mL/Hr) IV Continuous <Continuous>  nicotine -  14 mG/24Hr(s) Patch 1 Patch Transdermal daily    MEDICATIONS  (PRN):  acetaminophen     Tablet .. 650 milliGRAM(s) Oral every 6 hours PRN Temp greater or equal to 38C (100.4F), Mild Pain (1 - 3)  morphine  - Injectable 2 milliGRAM(s) IV Push every 6 hours PRN Severe Pain (7 - 10)  ondansetron Injectable 4 milliGRAM(s) IV Push every 8 hours PRN Nausea and/or Vomiting        LABS                                            15.2                  Neurophils% (auto):   70.3   (01-05 @ 08:42):    14.09)-----------(164          Lymphocytes% (auto):  23.5                                          43.5                   Eosinphils% (auto):   0.6      Manual%: Neutrophils x    ; Lymphocytes x    ; Eosinophils x    ; Bands%: x    ; Blasts x                                    131    |  99     |  7                   Calcium: 8.5   / iCa: x      (01-05 @ 08:42)    ----------------------------<  229       Magnesium: 1.8                              3.8     |  24     |  0.8              Phosphorous: x        TPro  6.5    /  Alb  3.7    /  TBili  1.5    /  DBili  x      /  AST  x      /  ALT  x      /  AlkPhos  90     05 Jan 2023 08:42            ASSESSMENT & PLAN:   42-year-old male with history of pancreatitis, social drinker presents to the ED accompanied by sister complaining of bandlike abdominal pain, 8/10 radiation to the back that started yesterday afternoon. Patient states pain got worse today.    #Acute pancreatitis secondary to hypertriglyceridemia  #no sepsis on admission  - likely alcoholic, last drink was on new years hiren  - typical abdominal pain and CT findings  - CT abd/ pelvis:   1.  Findings compatible with acute interstitial edematous pancreatitis;   no evidence of necrosis or peripancreatic fluid collections.  2.  Hepatic steatosis and hepatomegaly.  - Lipase 125, WBC 14K  - RUQ U/S: CBD 4mm, hepatic steatosis  - Follow up ferritin, ceroluplasmin, lipid panel, a1c, tsh  - Keep NPO  - c/w lactated ringers + potassium additive at 150 cc/hr  - started insulin gtt, can titrate down when Triglyceride <500  - Monitor Triglyceride BID  - Monitor pain and adjust pain medication accordingly  - Please obtain CTAP w/ IV contrast if clinical deterioration in 48-72 hours  - Monitor urine output daily - Target Urine output: 0.5cc/kg/hr  - Monitor BUN and HCT BID - Target BUN <20 and HCT <44 and adjust fluids accordingly    #hyponatremia  - likely due to hypovolemia in the setting of pancreatitis  - continue with fluids  - serum and urin osm  - urine na and cr  - monitor BMP    #pruritic abdominal rash  - appears to be hives  - unsure of related to contrast  - benadryl x 1 dose    #Hepatic steatosis with hepatomegaly  - RUQ U/S: hepatic steatosis  - fup ferritin, ceroluplasmin, lipid panel, a1c, tsh  - evaluated by GI  - will need hepatology follow up outpatient    #Misc:  - DVT ppx: lovenox  - GI ppx: protonix  - Code: FULL  - Activity: AAT  - Diet: NPO  - Dispo: upgrade to ICU

## 2023-01-05 NOTE — H&P ADULT - ASSESSMENT
42-year-old male with history of pancreatitis, social drinker presents to the ED accompanied by sister complaining of bandlike abdominal pain, 8/10 radiation to the back that started yesterday afternoon. Patient states pain got worse today.    #Acute pancreatitis  #no sepsis on admission  - likely alcoholic, last drink was on new years hiren  - typical abdominal pain and CT findings  - CT abd/ pelvis:   1.  Findings compatible with acute interstitial edematous pancreatitis;   no evidence of necrosis or peripancreatic fluid collections.  2.  Hepatic steatosis and hepatomegaly.  - Lipase 125, WBC 14K  - fup RUQ US  - fup ferritin, ceroluplasmin, lipid panel, a1c, tsh  - aggressive hydration  - clear liquid diet, advance as tolerated  - GI consult    #hyponatremia  - likely due to hypovolemia in the setting of pancreatitis  - fluid resuscition  - serum and urin osm  - urine na and cr    #Hepatic steatosis with hepatomegaly  - fup RUQ US  - fup ferritin, ceroluplasmin, lipid panel, a1c, tsh  - GI consult  - will need hepatology fup outpt    DVT ppx: lovenox  ppi  full code  ambulate as tolerated  clear liquid for now advance as tolerated

## 2023-01-06 LAB
A1C WITH ESTIMATED AVERAGE GLUCOSE RESULT: 10.1 % — HIGH (ref 4–5.6)
ALBUMIN SERPL ELPH-MCNC: 3.4 G/DL — LOW (ref 3.5–5.2)
ALP SERPL-CCNC: 76 U/L — SIGNIFICANT CHANGE UP (ref 30–115)
ALT FLD-CCNC: 18 U/L — SIGNIFICANT CHANGE UP (ref 0–41)
ANION GAP SERPL CALC-SCNC: 10 MMOL/L — SIGNIFICANT CHANGE UP (ref 7–14)
APTT BLD: 28.4 SEC — SIGNIFICANT CHANGE UP (ref 27–39.2)
AST SERPL-CCNC: 12 U/L — SIGNIFICANT CHANGE UP (ref 0–41)
BASOPHILS # BLD AUTO: 0.02 K/UL — SIGNIFICANT CHANGE UP (ref 0–0.2)
BASOPHILS NFR BLD AUTO: 0.2 % — SIGNIFICANT CHANGE UP (ref 0–1)
BILIRUB SERPL-MCNC: 1 MG/DL — SIGNIFICANT CHANGE UP (ref 0.2–1.2)
BLD GP AB SCN SERPL QL: SIGNIFICANT CHANGE UP
BUN SERPL-MCNC: 6 MG/DL — LOW (ref 10–20)
CALCIUM SERPL-MCNC: 8.2 MG/DL — LOW (ref 8.4–10.4)
CALCIUM SERPL-MCNC: 8.5 MG/DL — SIGNIFICANT CHANGE UP (ref 8.4–10.5)
CHLORIDE SERPL-SCNC: 102 MMOL/L — SIGNIFICANT CHANGE UP (ref 98–110)
CO2 SERPL-SCNC: 23 MMOL/L — SIGNIFICANT CHANGE UP (ref 17–32)
CREAT SERPL-MCNC: 0.7 MG/DL — SIGNIFICANT CHANGE UP (ref 0.7–1.5)
CULTURE RESULTS: SIGNIFICANT CHANGE UP
D DIMER BLD IA.RAPID-MCNC: 934 NG/ML DDU — HIGH
EGFR: 117 ML/MIN/1.73M2 — SIGNIFICANT CHANGE UP
EOSINOPHIL # BLD AUTO: 0.14 K/UL — SIGNIFICANT CHANGE UP (ref 0–0.7)
EOSINOPHIL NFR BLD AUTO: 1.3 % — SIGNIFICANT CHANGE UP (ref 0–8)
ESTIMATED AVERAGE GLUCOSE: 243 MG/DL — HIGH (ref 68–114)
FERRITIN SERPL-MCNC: 891 NG/ML — HIGH (ref 30–400)
GLUCOSE BLDC GLUCOMTR-MCNC: 116 MG/DL — HIGH (ref 70–99)
GLUCOSE BLDC GLUCOMTR-MCNC: 117 MG/DL — HIGH (ref 70–99)
GLUCOSE BLDC GLUCOMTR-MCNC: 120 MG/DL — HIGH (ref 70–99)
GLUCOSE BLDC GLUCOMTR-MCNC: 125 MG/DL — HIGH (ref 70–99)
GLUCOSE BLDC GLUCOMTR-MCNC: 130 MG/DL — HIGH (ref 70–99)
GLUCOSE BLDC GLUCOMTR-MCNC: 133 MG/DL — HIGH (ref 70–99)
GLUCOSE BLDC GLUCOMTR-MCNC: 136 MG/DL — HIGH (ref 70–99)
GLUCOSE BLDC GLUCOMTR-MCNC: 153 MG/DL — HIGH (ref 70–99)
GLUCOSE BLDC GLUCOMTR-MCNC: 156 MG/DL — HIGH (ref 70–99)
GLUCOSE BLDC GLUCOMTR-MCNC: 188 MG/DL — HIGH (ref 70–99)
GLUCOSE BLDC GLUCOMTR-MCNC: 191 MG/DL — HIGH (ref 70–99)
GLUCOSE BLDC GLUCOMTR-MCNC: 192 MG/DL — HIGH (ref 70–99)
GLUCOSE BLDC GLUCOMTR-MCNC: 194 MG/DL — HIGH (ref 70–99)
GLUCOSE SERPL-MCNC: 183 MG/DL — HIGH (ref 70–99)
HCT VFR BLD CALC: 38.7 % — LOW (ref 42–52)
HGB BLD-MCNC: 13.1 G/DL — LOW (ref 14–18)
IMM GRANULOCYTES NFR BLD AUTO: 0.5 % — HIGH (ref 0.1–0.3)
INR BLD: 1.08 RATIO — SIGNIFICANT CHANGE UP (ref 0.65–1.3)
LACTATE SERPL-SCNC: 0.8 MMOL/L — SIGNIFICANT CHANGE UP (ref 0.7–2)
LDH SERPL L TO P-CCNC: 182 U/L — SIGNIFICANT CHANGE UP (ref 50–242)
LYMPHOCYTES # BLD AUTO: 19.6 % — LOW (ref 20.5–51.1)
LYMPHOCYTES # BLD AUTO: 2.05 K/UL — SIGNIFICANT CHANGE UP (ref 1.2–3.4)
MAGNESIUM SERPL-MCNC: 2 MG/DL — SIGNIFICANT CHANGE UP (ref 1.8–2.4)
MCHC RBC-ENTMCNC: 30.5 PG — SIGNIFICANT CHANGE UP (ref 27–31)
MCHC RBC-ENTMCNC: 33.9 G/DL — SIGNIFICANT CHANGE UP (ref 32–37)
MCV RBC AUTO: 90 FL — SIGNIFICANT CHANGE UP (ref 80–94)
MONOCYTES # BLD AUTO: 0.59 K/UL — SIGNIFICANT CHANGE UP (ref 0.1–0.6)
MONOCYTES NFR BLD AUTO: 5.6 % — SIGNIFICANT CHANGE UP (ref 1.7–9.3)
NEUTROPHILS # BLD AUTO: 7.61 K/UL — HIGH (ref 1.4–6.5)
NEUTROPHILS NFR BLD AUTO: 72.8 % — SIGNIFICANT CHANGE UP (ref 42.2–75.2)
NRBC # BLD: 0 /100 WBCS — SIGNIFICANT CHANGE UP (ref 0–0)
PLATELET # BLD AUTO: 138 K/UL — SIGNIFICANT CHANGE UP (ref 130–400)
POTASSIUM SERPL-MCNC: 3.7 MMOL/L — SIGNIFICANT CHANGE UP (ref 3.5–5)
POTASSIUM SERPL-SCNC: 3.7 MMOL/L — SIGNIFICANT CHANGE UP (ref 3.5–5)
PROT SERPL-MCNC: 5.7 G/DL — LOW (ref 6–8)
PROTHROM AB SERPL-ACNC: 12.4 SEC — SIGNIFICANT CHANGE UP (ref 9.95–12.87)
RBC # BLD: 4.3 M/UL — LOW (ref 4.7–6.1)
RBC # FLD: 12.6 % — SIGNIFICANT CHANGE UP (ref 11.5–14.5)
SODIUM SERPL-SCNC: 135 MMOL/L — SIGNIFICANT CHANGE UP (ref 135–146)
SPECIMEN SOURCE: SIGNIFICANT CHANGE UP
TRIGL SERPL-MCNC: 1084 MG/DL — HIGH
TRIGL SERPL-MCNC: 693 MG/DL — HIGH
WBC # BLD: 10.46 K/UL — SIGNIFICANT CHANGE UP (ref 4.8–10.8)
WBC # FLD AUTO: 10.46 K/UL — SIGNIFICANT CHANGE UP (ref 4.8–10.8)

## 2023-01-06 PROCEDURE — 99233 SBSQ HOSP IP/OBS HIGH 50: CPT

## 2023-01-06 PROCEDURE — 71045 X-RAY EXAM CHEST 1 VIEW: CPT | Mod: 26

## 2023-01-06 RX ORDER — DIPHENHYDRAMINE HCL 50 MG
25 CAPSULE ORAL EVERY 6 HOURS
Refills: 0 | Status: DISCONTINUED | OUTPATIENT
Start: 2023-01-06 | End: 2023-01-06

## 2023-01-06 RX ORDER — CHLORHEXIDINE GLUCONATE 213 G/1000ML
1 SOLUTION TOPICAL DAILY
Refills: 0 | Status: DISCONTINUED | OUTPATIENT
Start: 2023-01-06 | End: 2023-01-11

## 2023-01-06 RX ORDER — SODIUM CHLORIDE 9 MG/ML
1000 INJECTION, SOLUTION INTRAVENOUS
Refills: 0 | Status: DISCONTINUED | OUTPATIENT
Start: 2023-01-06 | End: 2023-01-08

## 2023-01-06 RX ORDER — SODIUM CHLORIDE 9 MG/ML
1000 INJECTION, SOLUTION INTRAVENOUS
Refills: 0 | Status: DISCONTINUED | OUTPATIENT
Start: 2023-01-06 | End: 2023-01-06

## 2023-01-06 RX ORDER — DIPHENHYDRAMINE HCL 50 MG
25 CAPSULE ORAL EVERY 6 HOURS
Refills: 0 | Status: DISCONTINUED | OUTPATIENT
Start: 2023-01-06 | End: 2023-01-11

## 2023-01-06 RX ADMIN — Medication 1 PATCH: at 12:11

## 2023-01-06 RX ADMIN — ENOXAPARIN SODIUM 40 MILLIGRAM(S): 100 INJECTION SUBCUTANEOUS at 12:11

## 2023-01-06 RX ADMIN — CHLORHEXIDINE GLUCONATE 1 APPLICATION(S): 213 SOLUTION TOPICAL at 12:13

## 2023-01-06 RX ADMIN — INSULIN HUMAN 10 UNIT(S)/HR: 100 INJECTION, SOLUTION SUBCUTANEOUS at 05:20

## 2023-01-06 RX ADMIN — Medication 1 PATCH: at 06:03

## 2023-01-06 RX ADMIN — SODIUM CHLORIDE 150 MILLILITER(S): 9 INJECTION, SOLUTION INTRAVENOUS at 05:20

## 2023-01-06 RX ADMIN — Medication 1 PATCH: at 12:00

## 2023-01-06 RX ADMIN — SODIUM CHLORIDE 100 MILLILITER(S): 9 INJECTION, SOLUTION INTRAVENOUS at 21:35

## 2023-01-06 RX ADMIN — PANTOPRAZOLE SODIUM 40 MILLIGRAM(S): 20 TABLET, DELAYED RELEASE ORAL at 05:19

## 2023-01-06 RX ADMIN — Medication 25 MILLIGRAM(S): at 21:35

## 2023-01-06 RX ADMIN — Medication 145 MILLIGRAM(S): at 12:10

## 2023-01-06 RX ADMIN — Medication 1 PATCH: at 20:42

## 2023-01-06 RX ADMIN — ATORVASTATIN CALCIUM 80 MILLIGRAM(S): 80 TABLET, FILM COATED ORAL at 21:35

## 2023-01-06 NOTE — PROGRESS NOTE ADULT - SUBJECTIVE AND OBJECTIVE BOX
SUBJECTIVE:  Patient is a 42y old Male who presents with a chief complaint of  Acute pancreatitis     Today is hospital day 2d. Pt oxygen saturation fell to 85%, pt started on 2L NC. There are no other new issues or overnight events.     HPI  HPI:  42-year-old male with history of pancreatitis, social drinker presents to the ED accompanied by sister complaining of bandlike abdominal pain, 8/10 radiation to the back that started yesterday afternoon. Patient states pain got worse today.  Patient attempted to take pantoprazole with no relief so came to the ED.    Pt is a social drinker and last drink was on new years hiren.    ED vitals:   / 86, HR 78, RR 18, O2 sat Temp 97.8, on RA satting well    Sig labs:   white count 14k, lipase 125, Na 126    CT abd/ pelvis:   1.  Findings compatible with acute interstitial edematous pancreatitis;   no evidence of necrosis or peripancreatic fluid collections.  2.  Hepatic steatosis and hepatomegaly.    Pt given pain meds and fluids in the ED (2023 00:31)      PAST MEDICAL & SURGICAL HISTORY    ALLERGIES:  No Known Allergies    MEDICATIONS:  HOME MEDICATIONS    STANDING MEDICATIONS  atorvastatin 80 milliGRAM(s) Oral at bedtime  chlorhexidine 2% Cloths 1 Application(s) Topical daily  dextrose 5% + lactated ringers. 1000 milliLiter(s) IV Continuous <Continuous>  dextrose 50% Injectable 50 milliLiter(s) IV Push every 15 minutes  enoxaparin Injectable 40 milliGRAM(s) SubCutaneous every 24 hours  fenofibrate Tablet 145 milliGRAM(s) Oral daily  insulin regular Infusion 10 Unit(s)/Hr IV Continuous <Continuous>  nicotine -  14 mG/24Hr(s) Patch 1 Patch Transdermal daily  pantoprazole    Tablet 40 milliGRAM(s) Oral before breakfast    PRN MEDICATIONS  acetaminophen     Tablet .. 650 milliGRAM(s) Oral every 6 hours PRN  morphine  - Injectable 2 milliGRAM(s) IV Push every 6 hours PRN  ondansetron Injectable 4 milliGRAM(s) IV Push every 8 hours PRN    VITALS:   T(C): 37.7 (23 @ 04:00), Max: 38.2 (23 @ 15:23)  T(F): 99.8 (23 @ 04:00), Max: 100.7 (23 @ 15:23)  HR: 105 (23 @ 10:00) (94 - 105)  BP: 152/80 (23 @ 10:00) (102/63 - 152/80)  BP(mean): 93 (23 @ 10:00) (76 - 97)  ABP: --  ABP(mean): --  RR: 24 (23 @ 10:00) (14 - 28)  SpO2: 91% (23 @ 10:00) (85% - 95%)  LABS:                        13.1   10.46 )-----------( 138      ( 2023 04:54 )             38.7         135  |  102  |  6<L>  ----------------------------<  183<H>  3.7   |  23  |  0.7    Ca    8.5      2023 04:54  Mg     2.0         TPro  5.7<L>  /  Alb  3.4<L>  /  TBili  1.0  /  DBili  x   /  AST  12  /  ALT  18  /  AlkPhos  76  01-06    PT/INR - ( 2023 04:54 )   PT: 12.40 sec;   INR: 1.08 ratio         PTT - ( 2023 04:54 )  PTT:28.4 sec  Urinalysis Basic - ( 2023 15:57 )    Color: Yellow / Appearance: Clear / S.009 / pH: x  Gluc: x / Ketone: Small  / Bili: Negative / Urobili: 6 mg/dL   Blood: x / Protein: Trace / Nitrite: Negative   Leuk Esterase: Negative / RBC: x / WBC x   Sq Epi: x / Non Sq Epi: x / Bacteria: x      I&O's Detail    2023 07:01  -  2023 07:00  --------------------------------------------------------  IN:    dextrose 5% + lactated ringers: 1200 mL    Insulin: 28 mL  Total IN: 1228 mL    OUT:    Voided (mL): 1100 mL  Total OUT: 1100 mL    Total NET: 128 mL      2023 07:01  -  2023 10:47  --------------------------------------------------------  IN:    dextrose 5% + lactated ringers: 200 mL    Insulin: 10 mL  Total IN: 210 mL    OUT:    Voided (mL): 1000 mL  Total OUT: 1000 mL    Total NET: -790 mL                CARDIAC MARKERS ( 2023 17:55 )  x     / <0.01 ng/mL / x     / x     / x          RADIOLOGY:  EKG  12 Lead ECG:   Ventricular Rate 112 BPM    Atrial Rate 112 BPM    P-R Interval 146 ms    QRS Duration 84 ms    Q-T Interval 320 ms    QTC Calculation(Bazett) 436 ms    P Axis 34 degrees    R Axis 92 degrees    T Axis -10 degrees    Diagnosis Line Sinus tachycardia  Rightward axis  T wave abnormality, consider inferior ischemia  Abnormal ECG    Confirmed by PARKER CAMACHO, [facility] ([id number]) on 2023 11:03:37 PM (23 @ 20:48)    Xray Chest 1 View- PORTABLE-Urgent:   ACC: [id number] EXAM:  XR CHEST PORTABLE URGENT 1V                          PROCEDURE DATE:  2023          INTERPRETATION:  Clinical History / Reason for exam: Hypertriglyceridemia    Comparison : Chest radiograph 2023.    Technique/Positioning: Single AP chest radiograph.    Findings:    Support devices: None.    Cardiac/mediastinum/hilum: Unremarkable.    Lung parenchyma/Pleura: Low lung volumes with no focal consolidation,   effusion or pneumothorax.    Skeleton/soft tissues: Unremarkable.    Impression:    No radiographic evidence of acute cardiopulmonary disease.        --- End of Report ---            GINA ROBLES MD; Attending Radiologist  This document has been electronically signed. 2023  8:24AM (23 @ 07:51)  Xray Chest 1 View- PORTABLE-Urgent:   ACC: [id number] EXAM:  XR CHEST PORTABLE URGENT 1V                          PROCEDURE DATE:  2023          INTERPRETATION:  Clinical History / Reason for exam: Abdominal pain    Comparison : Chest radiograph None.    Technique/Positioning: Single AP view of the chest.    Findings:    Support devices: None.    Cardiac/mediastinum/hilum: Unremarkable.    Lung parenchyma/Pleura: Within normal limits.    Skeleton/soft tissues: No acute osseous abnormality    Impression:    No radiographic evidence of acute cardiopulmonary disease.        --- End of Report ---            ELLIOT LANDAU MD; Attending Radiologist  This document has been electronically signed. 2023  8:39AM (23 @ 18:33)    Physical Exam:  General: no acute distress, lying in bed  Head: normocephalic and atraumatic  Neck: supple  Heart: regular rate and rhythm, S1 and S2 normal, no murmurs, rubs or gallops noted on exam  Lungs: Symmetric chest expansion bilaterally, clear lungs bilaterally, no wheezes rhonchi or crackles heard  Abdomen: Bowel sounds present, non tender on light and deep palpation  Extremities: No edema, no clubbing or cyanosis notes, positive peripheral pulses SUBJECTIVE:  Patient is a 42y old Male who presents with a chief complaint of  Acute pancreatitis    Today is hospital day 2d. Pt oxygen saturation fell to 85%, pt started on 2L NC. Pt on insulin infusion, on IVF, Tmax 100.7 Slightly tachycardic. There are no other new issues or overnight events.       HPI  HPI:  42-year-old male with history of pancreatitis, social drinker presents to the ED accompanied by sister complaining of bandlike abdominal pain, 8/10 radiation to the back that started yesterday afternoon. Patient states pain got worse today.  Patient attempted to take pantoprazole with no relief so came to the ED.    Pt is a social drinker and last drink was on new years hiren.    ED vitals:   / 86, HR 78, RR 18, O2 sat Temp 97.8, on RA satting well    Sig labs:   white count 14k, lipase 125, Na 126    CT abd/ pelvis:   1.  Findings compatible with acute interstitial edematous pancreatitis;   no evidence of necrosis or peripancreatic fluid collections.  2.  Hepatic steatosis and hepatomegaly.    Pt given pain meds and fluids in the ED (2023 00:31)      PAST MEDICAL & SURGICAL HISTORY    ALLERGIES:  No Known Allergies    MEDICATIONS:  HOME MEDICATIONS    STANDING MEDICATIONS  atorvastatin 80 milliGRAM(s) Oral at bedtime  chlorhexidine 2% Cloths 1 Application(s) Topical daily  dextrose 5% + lactated ringers. 1000 milliLiter(s) IV Continuous <Continuous>  dextrose 50% Injectable 50 milliLiter(s) IV Push every 15 minutes  enoxaparin Injectable 40 milliGRAM(s) SubCutaneous every 24 hours  fenofibrate Tablet 145 milliGRAM(s) Oral daily  insulin regular Infusion 10 Unit(s)/Hr IV Continuous <Continuous>  nicotine -  14 mG/24Hr(s) Patch 1 Patch Transdermal daily  pantoprazole    Tablet 40 milliGRAM(s) Oral before breakfast    PRN MEDICATIONS  acetaminophen     Tablet .. 650 milliGRAM(s) Oral every 6 hours PRN  morphine  - Injectable 2 milliGRAM(s) IV Push every 6 hours PRN  ondansetron Injectable 4 milliGRAM(s) IV Push every 8 hours PRN    VITALS:   T(C): 37.7 (23 @ 04:00), Max: 38.2 (23 @ 15:23)  T(F): 99.8 (23 @ 04:00), Max: 100.7 (23 @ 15:23)  HR: 105 (23 @ 10:00) (94 - 105)  BP: 152/80 (23 @ 10:00) (102/63 - 152/80)  BP(mean): 93 (23 @ 10:00) (76 - 97)  ABP: --  ABP(mean): --  RR: 24 (23 @ 10:00) (14 - 28)  SpO2: 91% (23 @ 10:00) (85% - 95%)  LABS:                        13.1   10.46 )-----------( 138      ( 2023 04:54 )             38.7         135  |  102  |  6<L>  ----------------------------<  183<H>  3.7   |  23  |  0.7    Ca    8.5      2023 04:54  Mg     2.0         TPro  5.7<L>  /  Alb  3.4<L>  /  TBili  1.0  /  DBili  x   /  AST  12  /  ALT  18  /  AlkPhos  76  01-06    PT/INR - ( 2023 04:54 )   PT: 12.40 sec;   INR: 1.08 ratio         PTT - ( 2023 04:54 )  PTT:28.4 sec  Urinalysis Basic - ( 2023 15:57 )    Color: Yellow / Appearance: Clear / S.009 / pH: x  Gluc: x / Ketone: Small  / Bili: Negative / Urobili: 6 mg/dL   Blood: x / Protein: Trace / Nitrite: Negative   Leuk Esterase: Negative / RBC: x / WBC x   Sq Epi: x / Non Sq Epi: x / Bacteria: x      I&O's Detail    2023 07:01  -  2023 07:00  --------------------------------------------------------  IN:    dextrose 5% + lactated ringers: 1200 mL    Insulin: 28 mL  Total IN: 1228 mL    OUT:    Voided (mL): 1100 mL  Total OUT: 1100 mL    Total NET: 128 mL      2023 07:01  -  2023 10:47  --------------------------------------------------------  IN:    dextrose 5% + lactated ringers: 200 mL    Insulin: 10 mL  Total IN: 210 mL    OUT:    Voided (mL): 1000 mL  Total OUT: 1000 mL    Total NET: -790 mL                CARDIAC MARKERS ( 2023 17:55 )  x     / <0.01 ng/mL / x     / x     / x          RADIOLOGY:  EKG  12 Lead ECG:   Ventricular Rate 112 BPM    Atrial Rate 112 BPM    P-R Interval 146 ms    QRS Duration 84 ms    Q-T Interval 320 ms    QTC Calculation(Bazett) 436 ms    P Axis 34 degrees    R Axis 92 degrees    T Axis -10 degrees    Diagnosis Line Sinus tachycardia  Rightward axis  T wave abnormality, consider inferior ischemia  Abnormal ECG    Confirmed by PARKER CAMACHO, [facility] ([id number]) on 2023 11:03:37 PM (23 @ 20:48)    Xray Chest 1 View- PORTABLE-Urgent:   ACC: [id number] EXAM:  XR CHEST PORTABLE URGENT 1V                          PROCEDURE DATE:  2023          INTERPRETATION:  Clinical History / Reason for exam: Hypertriglyceridemia    Comparison : Chest radiograph 2023.    Technique/Positioning: Single AP chest radiograph.    Findings:    Support devices: None.    Cardiac/mediastinum/hilum: Unremarkable.    Lung parenchyma/Pleura: Low lung volumes with no focal consolidation,   effusion or pneumothorax.    Skeleton/soft tissues: Unremarkable.    Impression:    No radiographic evidence of acute cardiopulmonary disease.        --- End of Report ---            GINA ROBLES MD; Attending Radiologist  This document has been electronically signed. 2023  8:24AM (23 @ 07:51)  Xray Chest 1 View- PORTABLE-Urgent:   ACC: [id number] EXAM:  XR CHEST PORTABLE URGENT 1V                          PROCEDURE DATE:  2023          INTERPRETATION:  Clinical History / Reason for exam: Abdominal pain    Comparison : Chest radiograph None.    Technique/Positioning: Single AP view of the chest.    Findings:    Support devices: None.    Cardiac/mediastinum/hilum: Unremarkable.    Lung parenchyma/Pleura: Within normal limits.    Skeleton/soft tissues: No acute osseous abnormality    Impression:    No radiographic evidence of acute cardiopulmonary disease.        --- End of Report ---            ELLIOT LANDAU MD; Attending Radiologist  This document has been electronically signed. 2023  8:39AM (23 @ 18:33)    Physical Exam:  General: no acute distress, lying in bed  Head: normocephalic and atraumatic  Neck: supple  Heart: regular rate and rhythm, S1 and S2 normal, no murmurs, rubs or gallops noted on exam  Lungs: Symmetric chest expansion bilaterally, clear lungs bilaterally, no wheezes rhonchi or crackles heard  Abdomen: Bowel sounds present, non tender on light and deep palpation  Extremities: No edema, no clubbing or cyanosis notes, positive peripheral pulses

## 2023-01-06 NOTE — PROGRESS NOTE ADULT - SUBJECTIVE AND OBJECTIVE BOX
Patient is a 42y old  Male who presents with a chief complaint of       Over Night Events:    On insulin infusion  On IVF   Tmax 100.7  Slighlty tachycardic        ROS:  See HPI    PHYSICAL EXAM    ICU Vital Signs Last 24 Hrs  T(C): 37.7 (2023 04:00), Max: 38.2 (2023 15:23)  T(F): 99.8 (2023 04:00), Max: 100.7 (2023 15:23)  HR: 99 (2023 06:00) (97 - 107)  BP: 113/63 (2023 06:00) (112/65 - 128/77)  BP(mean): 79 (2023 06:00) (79 - 97)  ABP: --  ABP(mean): --  RR: 17 (2023 06:00) (14 - 19)  SpO2: 92% (2023 06:00) (85% - 95%)    O2 Parameters below as of 2023 06:00  Patient On (Oxygen Delivery Method): room air            General: NAD  HEENT: TIA             Lymphatic system: No cervical LN   Lungs: Bilateral BS  Cardiovascular: Regular   Gastrointestinal: Soft, Positive BS  Extremities: No clubbing.  Moves extremities.  Full Range of motion   Skin: Warm, intact  Neurological: No motor or sensory deficit       23 @ 07:01  -  23 @ 06:49  --------------------------------------------------------  IN:    dextrose 5% + lactated ringers: 1200 mL    Insulin: 28 mL  Total IN: 1228 mL    OUT:    Voided (mL): 1100 mL  Total OUT: 1100 mL    Total NET: 128 mL          LABS:                            13.1   10.46 )-----------( 138      ( 2023 04:54 )             38.7                                                   138  |  103  |  6<L>  ----------------------------<  153<H>  3.9   |  27  |  0.8    Ca    8.3<L>      2023 20:32  Mg     1.8     -    TPro  5.8<L>  /  Alb  3.3<L>  /  TBili  1.5<H>  /  DBili  x   /  AST  9   /  ALT  20  /  AlkPhos  81  -      PT/INR - ( 2023 04:54 )   PT: 12.40 sec;   INR: 1.08 ratio         PTT - ( 2023 04:54 )  PTT:28.4 sec                                       Urinalysis Basic - ( 2023 15:57 )    Color: Yellow / Appearance: Clear / S.009 / pH: x  Gluc: x / Ketone: Small  / Bili: Negative / Urobili: 6 mg/dL   Blood: x / Protein: Trace / Nitrite: Negative   Leuk Esterase: Negative / RBC: x / WBC x   Sq Epi: x / Non Sq Epi: x / Bacteria: x        CARDIAC MARKERS ( 2023 17:55 )  x     / <0.01 ng/mL / x     / x     / x                                                LIVER FUNCTIONS - ( 2023 14:26 )  Alb: 3.3 g/dL / Pro: 5.8 g/dL / ALK PHOS: 81 U/L / ALT: 20 U/L / AST: 9 U/L / GGT: x                                                                                                                                       MEDICATIONS  (STANDING):  atorvastatin 80 milliGRAM(s) Oral at bedtime  chlorhexidine 2% Cloths 1 Application(s) Topical daily  dextrose 5% + lactated ringers. 1000 milliLiter(s) (150 mL/Hr) IV Continuous <Continuous>  dextrose 50% Injectable 50 milliLiter(s) IV Push every 15 minutes  enoxaparin Injectable 40 milliGRAM(s) SubCutaneous every 24 hours  fenofibrate Tablet 145 milliGRAM(s) Oral daily  insulin regular Infusion 10 Unit(s)/Hr (10 mL/Hr) IV Continuous <Continuous>  nicotine -  14 mG/24Hr(s) Patch 1 Patch Transdermal daily  pantoprazole    Tablet 40 milliGRAM(s) Oral before breakfast    MEDICATIONS  (PRN):  acetaminophen     Tablet .. 650 milliGRAM(s) Oral every 6 hours PRN Temp greater or equal to 38C (100.4F), Mild Pain (1 - 3)  morphine  - Injectable 2 milliGRAM(s) IV Push every 6 hours PRN Severe Pain (7 - 10)  ondansetron Injectable 4 milliGRAM(s) IV Push every 8 hours PRN Nausea and/or Vomiting

## 2023-01-06 NOTE — CHART NOTE - NSCHARTNOTEFT_GEN_A_CORE
was called by RN regarding body rash and facial redness  as per wife, the rash had occurred yesterday evening, even before insulin was started and says that the pt was on LR at that time.  The rash resolved w benadryl yesterday    similar co today, but also has facial redness and swelling   On exam no tongue swelling, voice wnl, no sob  VS stable  will give IV benadryl 25mg IV and change d5 +LR to d5 + NS     Will monitor

## 2023-01-06 NOTE — PROGRESS NOTE ADULT - ATTENDING COMMENTS
I edited the note.   Time-based billing (NON-critical care).   35 minutes spent on total encounter; more than 50% of the visit was spent counseling and / or coordinating care by the attending physician.  The necessity of the time spent during the encounter on this date of service was due to: Coordination of care.
42-year-old male with history of pancreatitis, social drinker presents to the ED accompanied by sister complaining of bandlike abdominal pain, 8/10 radiation to the back that started yesterday afternoon. Patient states pain got worse today.    #Acute Moderately-Severe Interstitial Pancreatitis d/t Hypertriglyceridemia  #no sepsis on admission  - CT abd/ pelvis: Findings compatible with acute interstitial edematous pancreatitis; no evidence of necrosis or peripancreatic fluid collections.  - Lipase 125, WBC 14K  - RUQ U/S: CBD 4mm, hepatic steatosis  - continue with IVFs: LR @200cc/hr  GI Recc as follows:  - Keep NPO  - c/w lactated ringers at  50 cc/hr  - Please start insulin drip at 0.1-0.3 unit/kg/hr - Can titrate down when Triglyceride <500  - Can restart fenofibrate when Triglyceride <1000  - Monitor Triglyceride BID  - Monitor pain and adjust pain medication accordingly  - Please obtain CTAP w/ IV contrast if clinical deterioration in 48-72 hours  - Monitor urine output daily - Target Urine output: 0.5cc/kg/hr  - Monitor BUN and HCT BID - Target BUN <20 and HCT <44 and adjust fluids accordingly    #Hyponatremia  - likely due to hypovolemia in the setting of pancreatitis  - continue with fluids    #pruritic abdominal rash  - appears to be hives  - unsure if related to contrast  - benadryl x 1 dose, monitor closely     #Hepatic steatosis with hepatomegaly  - RUQ U/S: hepatic steatosis  - fup ferritin, ceroluplasmin, lipid panel, a1c, tsh  - will need hepatology follow up outpatient    Dispo: Upgraded to ICU       Patricia Clements DO

## 2023-01-06 NOTE — PROGRESS NOTE ADULT - ASSESSMENT
42-year-old male with history of pancreatitis, social drinker presents to the ED accompanied by sister complaining of bandlike abdominal pain, 8/10 radiation to the back that started yesterday afternoon. GI is called for evaluation of pancreatitis thought to be 2/2 hyperTG.    # Acute Moderately-Severe Interstitial Pancreatitis d/t Hypertriglyceridemia  - Hemodynamically stable and no evidence of Sepsis  - Lipase 124 - LFTs: normal   - CTAP - as above  - US: cbd 4 mm   - pt w/ hyperglycemia on admission and would benefit from insulin drip  - TGs trending down, repeat this morning 1084    Rec:  - start clear liquid diet   - c/w lactated ringers at 1.5 ml/kg/hr for maintenance therapy to avoid volume overload  - c/w insulin drip at 0.1-0.3 unit/kg/hr - Can discontinue insulin when Triglyceride <500  - avoid hypoglycemia; FBG checks per ICU protocol and c/w IVF w/ D5LR per primary team  - Can restart fenofibrate when Triglyceride <1000  - Monitor Triglyceride BID  - Monitor pain and adjust pain medication accordingly  - Please obtain repeat CTAP w/ IV contrast if clinical deterioration in 72 hours  - Monitor urine output daily - Target Urine output: 0.5cc/kg/hr  - Monitor BUN and HCT BID - Target BUN <20 and HCT <44 and adjust fluids accordingly  - if patient doing well in am, can start on clear liquid diet    #hepatic steatosis  pt denies alcohol use   could be from NAFLD  LFTs WNL  -can check hep serologies (hep A IgG, hep B core ab, surface ab and ag, HCV ab), iron studies w/ TIBC, %sat and ferritin  -outpatient f/u at the hepatology clinic    discussed with sister at the bedside extensively

## 2023-01-06 NOTE — PROGRESS NOTE ADULT - SUBJECTIVE AND OBJECTIVE BOX
Gastroenterology progress note:     Patient is a 42y old  Male who presents with a chief complaint of Acute Pancreatitis (06 Jan 2023 10:46)       Admitted on: 01-04-23    We are following the patient for: acute pancreatitis        Interval History:    No acute events overnight.   mild abdominal pain  remains NPO     PAST MEDICAL & SURGICAL HISTORY:      MEDICATIONS  (STANDING):  atorvastatin 80 milliGRAM(s) Oral at bedtime  chlorhexidine 2% Cloths 1 Application(s) Topical daily  dextrose 5% + lactated ringers. 1000 milliLiter(s) (100 mL/Hr) IV Continuous <Continuous>  dextrose 50% Injectable 50 milliLiter(s) IV Push every 15 minutes  enoxaparin Injectable 40 milliGRAM(s) SubCutaneous every 24 hours  fenofibrate Tablet 145 milliGRAM(s) Oral daily  insulin regular Infusion 10 Unit(s)/Hr (10 mL/Hr) IV Continuous <Continuous>  nicotine -  14 mG/24Hr(s) Patch 1 Patch Transdermal daily  pantoprazole    Tablet 40 milliGRAM(s) Oral before breakfast    MEDICATIONS  (PRN):  acetaminophen     Tablet .. 650 milliGRAM(s) Oral every 6 hours PRN Temp greater or equal to 38C (100.4F), Mild Pain (1 - 3)  morphine  - Injectable 2 milliGRAM(s) IV Push every 6 hours PRN Severe Pain (7 - 10)  ondansetron Injectable 4 milliGRAM(s) IV Push every 8 hours PRN Nausea and/or Vomiting      Allergies  No Known Allergies      Review of Systems:   Cardiovascular:  No Chest Pain, No Palpitations  Respiratory:  No Cough, No Dyspnea  Gastrointestinal:  As described in HPI  Skin:  No Skin Lesions, No Jaundice  Neuro:  No Syncope, No Dizziness    Physical Examination:  T(C): 36.1 (01-06-23 @ 08:00), Max: 38.2 (01-05-23 @ 15:23)  HR: 100 (01-06-23 @ 12:00) (94 - 105)  BP: 120/71 (01-06-23 @ 12:00) (102/63 - 152/80)  RR: 22 (01-06-23 @ 12:00) (14 - 28)  SpO2: 95% (01-06-23 @ 12:00) (85% - 95%)  Weight (kg): 101.4 (01-06-23 @ 00:50)    01-05-23 @ 07:01  -  01-06-23 @ 07:00  --------------------------------------------------------  IN: 1228 mL / OUT: 1100 mL / NET: 128 mL    01-06-23 @ 07:01  -  01-06-23 @ 13:42  --------------------------------------------------------  IN: 210 mL / OUT: 1000 mL / NET: -790 mL        GENERAL: AAOx3, no acute distress.  HEAD:  Atraumatic, Normocephalic  EYES: conjunctiva and sclera clear  NECK: Supple, no JVD or thyromegaly  CHEST/LUNG: Clear to auscultation bilaterally; No wheeze, rhonchi, or rales  HEART: Regular rate and rhythm; normal S1, S2, No murmurs.  ABDOMEN: Soft, nontender, nondistended; Bowel sounds present  NEUROLOGY: No asterixis or tremor.   SKIN: Intact, no jaundice     Data:                        13.1   10.46 )-----------( 138      ( 06 Jan 2023 04:54 )             38.7     Hgb trend:  13.1  01-06-23 @ 04:54  15.2  01-05-23 @ 08:42  14.0  01-04-23 @ 17:55        01-06    135  |  102  |  6<L>  ----------------------------<  183<H>  3.7   |  23  |  0.7    Ca    8.5      06 Jan 2023 04:54  Mg     2.0     01-06    TPro  5.7<L>  /  Alb  3.4<L>  /  TBili  1.0  /  DBili  x   /  AST  12  /  ALT  18  /  AlkPhos  76  01-06    Liver panel trend:  TBili 1.0   /   AST 12   /   ALT 18   /   AlkP 76   /   Tptn 5.7   /   Alb 3.4    /   DBili --      01-06  TBili 1.5   /   AST 9   /   ALT 20   /   AlkP 81   /   Tptn 5.8   /   Alb 3.3    /   DBili --      01-05  TBili 1.5   /   AST <5   /   ALT <5   /   AlkP 90   /   Tptn 6.5   /   Alb 3.7    /   DBili --      01-05  TBili 1.4   /   AST 5   /   ALT 5   /   AlkP 87   /   Tptn 7.4   /   Alb 4.3    /   DBili 0.2      01-04      PT/INR - ( 06 Jan 2023 04:54 )   PT: 12.40 sec;   INR: 1.08 ratio         PTT - ( 06 Jan 2023 04:54 )  PTT:28.4 sec       Radiology:    US Abdomen Upper Quadrant Right:   ACC: [id number] EXAM:  US ABDOMEN RT UPR QUADRANT                          PROCEDURE DATE:  01/05/2023          INTERPRETATION:  CLINICAL INFORMATION: Abdominal pain. Findings   compatible with acute pancreatitis on recent CT abdomen and pelvis.    COMPARISON: CT abdomen and pelvis 1/4/2023.    TECHNIQUE: Sonography of the right upper quadrant.    FINDINGS:  Liver: Increased echogenicity.  Bile ducts: Normal caliber. Common bile duct measures 4 mm.  Gallbladder: Within normal limits.  Pancreas: Poorly visualized.  Right kidney: 10.1 cm. No hydronephrosis.  Ascites: None.  IVC: Visualized portions are within normal limits.    IMPRESSION:  Hepatic steatosis.    Poor visualization of the pancreas, please see concurrent CT scan   performed prior day.        --- End of Report ---          LAURA ZAMORANO MD; Resident Radiologist  This document has been electronically signed.  JOHANA ALVES MD; Attending Interventional Radiologist  This document has been electronically signed. Jan 5 2023  5:05PM (01-05-23 @ 08:23)

## 2023-01-06 NOTE — CONSULT NOTE ADULT - SUBJECTIVE AND OBJECTIVE BOX
Reason for Endocrinology Consult: Diabetes    HPI: 42y Male      PAST MEDICAL & SURGICAL HISTORY:    FAMILY HISTORY:      SH:  Smoking  Etoh:  Recreational Drugs:    Home Medications:      Current (Non-Endocrine) Meds:  acetaminophen     Tablet .. 650 milliGRAM(s) Oral every 6 hours PRN  chlorhexidine 2% Cloths 1 Application(s) Topical daily  dextrose 5% + lactated ringers. 1000 milliLiter(s) IV Continuous <Continuous>  enoxaparin Injectable 40 milliGRAM(s) SubCutaneous every 24 hours  morphine  - Injectable 2 milliGRAM(s) IV Push every 6 hours PRN  nicotine -  14 mG/24Hr(s) Patch 1 Patch Transdermal daily  ondansetron Injectable 4 milliGRAM(s) IV Push every 8 hours PRN  pantoprazole    Tablet 40 milliGRAM(s) Oral before breakfast      Current Endocrine Meds:   atorvastatin 80 milliGRAM(s) Oral at bedtime  dextrose 50% Injectable 50 milliLiter(s) IV Push every 15 minutes  fenofibrate Tablet 145 milliGRAM(s) Oral daily  insulin regular Infusion 10 Unit(s)/Hr IV Continuous <Continuous>      Allergies:  No Known Allergies      ROS:  Denies the following except as indicated.    General: weight loss/weight gain, decreased appetite, fatigue, fever  Eyes: blurry vision, double vision  ENT: neck swelling, dysphagia, voice changes   CV: palpitations, SOB, chest pain, cough  GI: nausea, vomiting, diarrhea, constipation, abdominal pain  : nocturia,  polyuria, dysuria  Endo: decreased libido, heat/cold intolerance, jitteriness  MSK: arthralgias, myalgias  Skin: rash, dryness, diaphoresis  Neuro: pedal numbness,pedal paresthesias, pedal pain    Height (cm): 172.7 ( @ 00:50)  Weight (kg): 101.4 ( @ 00:50)  BMI (kg/m2): 34 ( @ 00:50)    Vital Signs Last 24 Hrs  T(C): 36.4 (2023 15:00), Max: 37.7 (2023 04:00)  T(F): 97.6 (2023 15:00), Max: 99.8 (2023 04:00)  HR: 97 (2023 15:00) (94 - 105)  BP: 110/62 (2023 15:00) (102/63 - 152/80)  BP(mean): 81 (2023 15:00) (76 - 97)  RR: 18 (2023 15:00) (14 - 28)  SpO2: 93% (2023 15:00) (85% - 95%)    Parameters below as of 2023 12:00  Patient On (Oxygen Delivery Method): room air      Constitutional: WN/WD in NAD.   Neck: no thyromegaly or palpable thyroid nodules   Respiratory: lungs CTAB.  Cardiovascular: regular rate and rhythm, normal S1 and S2, no audible murmurs  GI: soft, NT/ND, no masses/HSM appreciated.  Ext: no edema, no ulcers, pedal pulses palpable bilaterally  Neurology: no tremor, monofilament sensation intact in feet  Psychiatric: A&O x 3, normal affect/mood.        LABS:                        13.1   10.46 )-----------( 138      ( 2023 04:54 )             38.7     01-    135  |  102  |  6<L>  ----------------------------<  183<H>  3.7   |  23  |  0.7    Ca    8.5      2023 04:54  Mg     2.0     01-06    TPro  5.7<L>  /  Alb  3.4<L>  /  TBili  1.0  /  DBili  x   /  AST  12  /  ALT  18  /  AlkPhos  76  01-06    PT/INR - ( 2023 04:54 )   PT: 12.40 sec;   INR: 1.08 ratio         PTT - ( 2023 04:54 )  PTT:28.4 sec  Urinalysis Basic - ( 2023 15:57 )    Color: Yellow / Appearance: Clear / S.009 / pH: x  Gluc: x / Ketone: Small  / Bili: Negative / Urobili: 6 mg/dL   Blood: x / Protein: Trace / Nitrite: Negative   Leuk Esterase: Negative / RBC: x / WBC x   Sq Epi: x / Non Sq Epi: x / Bacteria: x                             Thyroid Stimulating Hormone, Serum: 0.43 ( @ 08:42)          RADIOLOGY & ADDITIONAL STUDIES:    A/P:42yMale    1Above discussed with resident.

## 2023-01-06 NOTE — CONSULT NOTE ADULT - ASSESSMENT
obesity,uncontrolled type 2diabetes, hypertriglieridemia,and pancreatitis, thehyponatremia is pseudohyponatremia duetotriglicerides, andglucose. when stableswitch to sqinsulin, glargine 30 unitsdaily,andlispro 12unitsbefore meals, stop all fruitjuices, and refined sugars. when eating, start metformin 850mg. after breakfat andevening meal, pioglitazone 30 mg.daily, and add atorvastatin 40 mg. daily. can stop insulin at discharge.

## 2023-01-06 NOTE — PROGRESS NOTE ADULT - ASSESSMENT
IMPRESSION:    Hypertriglyceridemia-induced pancreatitis  Hypovolemic hyponatremia  Hepatic steatosis  SIRS  Active smoker  HO alcohol use, pancreatitis    PLAN:    CNS: Avoid sedatives. Nicotine patch. Pain control.     HEENT: Oral care    PULMONARY: HOB @ 45 degrees. Aspiration precautions. Counseled patient on smoking cessation. Repeat CXR today. Keep spo2 more than 92%.     CARDIOVASCULAR: Goal directed fluid resuscitation. Lower IVF to 100cc/hr. Avoid fluid overload.     GI: GI prophylaxis. Feeding: Advance diet as tolerated. Bowel regimen. Trend LFTs. US abdomen done with no evidence of Gallstones. GI following.     RENAL: Follow up renal function and lytes. Correct as needed. Keep K>4 and Mg >2.    INFECTIOUS DISEASE: Monitor vital signs. Follow up cultures. No indicaitons for abx as of yet.     HEMATOLOGICAL: DVT prophylaxis; keep hgb more than 7; check coags. Check duplex lower extremities given elevated D-dimer.     ENDOCRINE: Start statins and fenofibrate. Endocrinology evaluation. Check trig level and if less than 500 than the insulin infusion can be discontinued. Continue D5LR as long the insulin drip is running.      MUSCULOSKELETAL: Out of bed.     DISPO: Remains in CCU until off insulin infusion.      IMPRESSION:    Hypertriglyceridemia-induced pancreatitis  Hypovolemic hyponatremia  Hepatic steatosis  SIRS  Active smoker  HO alcohol use, pancreatitis    PLAN:    CNS: Avoid sedatives. Nicotine patch. Pain control.     HEENT: Oral care    PULMONARY: HOB @ 45 degrees. Aspiration precautions. Counseled patient on smoking cessation. Repeat CXR today. Keep spo2 more than 92%.     CARDIOVASCULAR: Goal directed fluid resuscitation. Lower IVF to 100cc/hr. Avoid fluid overload.     GI: GI prophylaxis. Feeding: Advance diet as tolerated. Bowel regimen. Trend LFTs. US abdomen done with no evidence of Gallstones. GI following.     RENAL: Follow up renal function and lytes. Correct as needed. Keep K>4 and Mg >2. Check calcium level and Lactic acid level today.     INFECTIOUS DISEASE: Monitor vital signs. Follow up cultures. No indicaitons for abx as of yet.     HEMATOLOGICAL: DVT prophylaxis; keep hgb more than 7; check coags. Check duplex lower extremities given elevated D-dimer.     ENDOCRINE: Start statins and fenofibrate. Endocrinology evaluation. Check trig level and if less than 500 than the insulin infusion can be discontinued. Continue D5LR as long the insulin drip is running.      MUSCULOSKELETAL: Out of bed.     DISPO: Remains in CCU until off insulin infusion.

## 2023-01-07 LAB
ALBUMIN SERPL ELPH-MCNC: 3.4 G/DL — LOW (ref 3.5–5.2)
ALP SERPL-CCNC: 78 U/L — SIGNIFICANT CHANGE UP (ref 30–115)
ALT FLD-CCNC: 18 U/L — SIGNIFICANT CHANGE UP (ref 0–41)
ANION GAP SERPL CALC-SCNC: 11 MMOL/L — SIGNIFICANT CHANGE UP (ref 7–14)
AST SERPL-CCNC: 16 U/L — SIGNIFICANT CHANGE UP (ref 0–41)
BILIRUB SERPL-MCNC: 0.8 MG/DL — SIGNIFICANT CHANGE UP (ref 0.2–1.2)
BUN SERPL-MCNC: 6 MG/DL — LOW (ref 10–20)
CALCIUM SERPL-MCNC: 8.5 MG/DL — SIGNIFICANT CHANGE UP (ref 8.4–10.5)
CHLORIDE SERPL-SCNC: 102 MMOL/L — SIGNIFICANT CHANGE UP (ref 98–110)
CO2 SERPL-SCNC: 25 MMOL/L — SIGNIFICANT CHANGE UP (ref 17–32)
CREAT SERPL-MCNC: 0.8 MG/DL — SIGNIFICANT CHANGE UP (ref 0.7–1.5)
EGFR: 113 ML/MIN/1.73M2 — SIGNIFICANT CHANGE UP
GLUCOSE BLDC GLUCOMTR-MCNC: 102 MG/DL — HIGH (ref 70–99)
GLUCOSE BLDC GLUCOMTR-MCNC: 103 MG/DL — HIGH (ref 70–99)
GLUCOSE BLDC GLUCOMTR-MCNC: 106 MG/DL — HIGH (ref 70–99)
GLUCOSE BLDC GLUCOMTR-MCNC: 106 MG/DL — HIGH (ref 70–99)
GLUCOSE BLDC GLUCOMTR-MCNC: 115 MG/DL — HIGH (ref 70–99)
GLUCOSE BLDC GLUCOMTR-MCNC: 117 MG/DL — HIGH (ref 70–99)
GLUCOSE BLDC GLUCOMTR-MCNC: 122 MG/DL — HIGH (ref 70–99)
GLUCOSE BLDC GLUCOMTR-MCNC: 125 MG/DL — HIGH (ref 70–99)
GLUCOSE BLDC GLUCOMTR-MCNC: 128 MG/DL — HIGH (ref 70–99)
GLUCOSE BLDC GLUCOMTR-MCNC: 129 MG/DL — HIGH (ref 70–99)
GLUCOSE BLDC GLUCOMTR-MCNC: 130 MG/DL — HIGH (ref 70–99)
GLUCOSE BLDC GLUCOMTR-MCNC: 141 MG/DL — HIGH (ref 70–99)
GLUCOSE SERPL-MCNC: 129 MG/DL — HIGH (ref 70–99)
HCT VFR BLD CALC: 39.6 % — LOW (ref 42–52)
HGB BLD-MCNC: 13 G/DL — LOW (ref 14–18)
LACTATE SERPL-SCNC: 0.7 MMOL/L — SIGNIFICANT CHANGE UP (ref 0.7–2)
MAGNESIUM SERPL-MCNC: 2 MG/DL — SIGNIFICANT CHANGE UP (ref 1.8–2.4)
MCHC RBC-ENTMCNC: 29.9 PG — SIGNIFICANT CHANGE UP (ref 27–31)
MCHC RBC-ENTMCNC: 32.8 G/DL — SIGNIFICANT CHANGE UP (ref 32–37)
MCV RBC AUTO: 91 FL — SIGNIFICANT CHANGE UP (ref 80–94)
NRBC # BLD: 0 /100 WBCS — SIGNIFICANT CHANGE UP (ref 0–0)
PLATELET # BLD AUTO: 175 K/UL — SIGNIFICANT CHANGE UP (ref 130–400)
POTASSIUM SERPL-MCNC: 3.9 MMOL/L — SIGNIFICANT CHANGE UP (ref 3.5–5)
POTASSIUM SERPL-SCNC: 3.9 MMOL/L — SIGNIFICANT CHANGE UP (ref 3.5–5)
PROT SERPL-MCNC: 6 G/DL — SIGNIFICANT CHANGE UP (ref 6–8)
RBC # BLD: 4.35 M/UL — LOW (ref 4.7–6.1)
RBC # FLD: 12.7 % — SIGNIFICANT CHANGE UP (ref 11.5–14.5)
SODIUM SERPL-SCNC: 138 MMOL/L — SIGNIFICANT CHANGE UP (ref 135–146)
TRIGL SERPL-MCNC: 629 MG/DL — HIGH
WBC # BLD: 9.08 K/UL — SIGNIFICANT CHANGE UP (ref 4.8–10.8)
WBC # FLD AUTO: 9.08 K/UL — SIGNIFICANT CHANGE UP (ref 4.8–10.8)

## 2023-01-07 PROCEDURE — 93970 EXTREMITY STUDY: CPT | Mod: 26

## 2023-01-07 PROCEDURE — 71045 X-RAY EXAM CHEST 1 VIEW: CPT | Mod: 26

## 2023-01-07 PROCEDURE — 99233 SBSQ HOSP IP/OBS HIGH 50: CPT

## 2023-01-07 RX ADMIN — CHLORHEXIDINE GLUCONATE 1 APPLICATION(S): 213 SOLUTION TOPICAL at 12:25

## 2023-01-07 RX ADMIN — ENOXAPARIN SODIUM 40 MILLIGRAM(S): 100 INJECTION SUBCUTANEOUS at 12:27

## 2023-01-07 RX ADMIN — ATORVASTATIN CALCIUM 80 MILLIGRAM(S): 80 TABLET, FILM COATED ORAL at 21:03

## 2023-01-07 RX ADMIN — Medication 1 PATCH: at 12:27

## 2023-01-07 RX ADMIN — Medication 145 MILLIGRAM(S): at 12:30

## 2023-01-07 RX ADMIN — PANTOPRAZOLE SODIUM 40 MILLIGRAM(S): 20 TABLET, DELAYED RELEASE ORAL at 06:34

## 2023-01-07 RX ADMIN — Medication 1 PATCH: at 19:12

## 2023-01-07 RX ADMIN — Medication 1 PATCH: at 12:30

## 2023-01-07 RX ADMIN — Medication 1 PATCH: at 07:05

## 2023-01-07 NOTE — PROGRESS NOTE ADULT - SUBJECTIVE AND OBJECTIVE BOX
Patient is a 42y old  Male who presents with a chief complaint of Acute Pancreatitis (2023 10:46)        HPI:  42-year-old male with history of pancreatitis, social drinker presents to the ED accompanied by sister complaining of bandlike abdominal pain, 8/10 radiation to the back that started yesterday afternoon. Patient states pain got worse today.  Patient attempted to take pantoprazole with no relief so came to the ED.    Pt is a social drinker and last drink was on new years hiren.    ED vitals:   / 86, HR 78, RR 18, O2 sat Temp 97.8, on RA satting well    Sig labs:   white count 14k, lipase 125, Na 126    CT abd/ pelvis:   1.  Findings compatible with acute interstitial edematous pancreatitis;   no evidence of necrosis or peripancreatic fluid collections.  2.  Hepatic steatosis and hepatomegaly.    Pt given pain meds and fluids in the ED (2023 00:31)      Pt evaluated on rounds.  I reviewed the radiology tests and hospital record.    I reviewed previous notes on this patient.    Interval Events: No overnight events.      REVIEW OF SYSTEMS:   see HPI      OBJECTIVE:  ICU Vital Signs Last 24 Hrs  T(C): 36.9 (2023 04:00), Max: 36.9 (2023 04:00)  T(F): 98.4 (2023 04:00), Max: 98.4 (2023 04:00)  HR: 87 (2023 06:00) (86 - 105)  BP: 117/63 (2023 06:00) (108/62 - 152/80)  BP(mean): 83 (2023 06:00) (77 - 94)  RR: 3 (2023 06:00) (3 - 28)  SpO2: 91% (2023 06:00) (91% - 97%)    O2 Parameters below as of 2023 06:00  Patient On (Oxygen Delivery Method): room air         @ 07:01  -   @ 07:00  --------------------------------------------------------  IN: 2349 mL / OUT: 2730 mL / NET: -381 mL      CAPILLARY BLOOD GLUCOSE      POCT Blood Glucose.: 125 mg/dL (2023 06:33)        PHYSICAL EXAM:       · ENMT:   Airway patent,   Nasal mucosa clear.  Mouth with normal mucosa.   No thrush    · EYES:   Clear bilaterally,   pupils equal,   round and reactive to light.    · CARDIAC:   Normal rate,   regular rhythm.    Heart sounds S1, S2.   No murmurs, no rubs or gallops on auscultation  no edema        CAROTID:   normal systolic impulse  no bruits    · RESPIRATORY:   rales  normal chest expansion  no retractions or use of accessory muscles  percussion of chest demonstrates no hyperresonance or dullness    · GASTROINTESTINAL:  Abdomen soft,   non-tender,   + BS  liver/spleen not palpable    · MUSCULOSKELETAL:   no clubbing, cyanosis      · SKIN:   Skin normal color for race,   warm, dry   No evidence of rash.        · HEME LYMPH:   no splenomegaly.  No cervical  lymphadenopathy.  no inguinal lymphadenopathy    HOSPITAL MEDICATIONS:  MEDICATIONS  (STANDING):  atorvastatin 80 milliGRAM(s) Oral at bedtime  chlorhexidine 2% Cloths 1 Application(s) Topical daily  dextrose 5% + sodium chloride 0.9%. 1000 milliLiter(s) (100 mL/Hr) IV Continuous <Continuous>  dextrose 50% Injectable 50 milliLiter(s) IV Push every 15 minutes  enoxaparin Injectable 40 milliGRAM(s) SubCutaneous every 24 hours  fenofibrate Tablet 145 milliGRAM(s) Oral daily  insulin regular Infusion 10 Unit(s)/Hr (10 mL/Hr) IV Continuous <Continuous>  nicotine -  14 mG/24Hr(s) Patch 1 Patch Transdermal daily  pantoprazole    Tablet 40 milliGRAM(s) Oral before breakfast    MEDICATIONS  (PRN):  acetaminophen     Tablet .. 650 milliGRAM(s) Oral every 6 hours PRN Temp greater or equal to 38C (100.4F), Mild Pain (1 - 3)  diphenhydrAMINE Injectable 25 milliGRAM(s) IV Push every 6 hours PRN facial redness and swelling  morphine  - Injectable 2 milliGRAM(s) IV Push every 6 hours PRN Severe Pain (7 - 10)  ondansetron Injectable 4 milliGRAM(s) IV Push every 8 hours PRN Nausea and/or Vomiting    lactated ringers: 1000 milliLiter(s)      with potassium chloride additive 20 milliEquivalent(s), infuse at 150 mL/Hr  lactated ringers: 1000 milliLiter(s)      with potassium chloride additive 20 milliEquivalent(s), infuse at 125 mL/Hr  lactated ringers.: Solution, 1000 milliLiter(s) infuse at 200 mL/Hr  lactated ringers Bolus:   1000 milliLiter(s), IV Bolus, once, infuse over 60 Minute(s), Stop After 1 Doses  Provider's Contact #: [phone]  sodium chloride 0.9% Bolus:   1000 milliLiter(s), IV Bolus, once, infuse over 1 Hour(s), Stop After 1 Doses      LABS:                        13.0   9.08  )-----------( 175      ( 2023 04:48 )             39.6     01-07    138  |  102  |  6<L>  ----------------------------<  129<H>  3.9   |  25  |  0.8    Ca    8.5      2023 04:48  Mg     2.0     01-07    TPro  6.0  /  Alb  3.4<L>  /  TBili  0.8  /  DBili  x   /  AST  16  /  ALT  18  /  AlkPhos  78  01-07    PT/INR - ( 2023 04:54 )   PT: 12.40 sec;   INR: 1.08 ratio         PTT - ( 2023 04:54 )  PTT:28.4 sec  Urinalysis Basic - ( 2023 15:57 )    Color: Yellow / Appearance: Clear / S.009 / pH: x  Gluc: x / Ketone: Small  / Bili: Negative / Urobili: 6 mg/dL   Blood: x / Protein: Trace / Nitrite: Negative   Leuk Esterase: Negative / RBC: x / WBC x   Sq Epi: x / Non Sq Epi: x / Bacteria: x          COVID-19 PCR: NotDetec (2023 17:55)            RADIOLOGY: Today I personally interpreted the latest CXR and other pertinent films.

## 2023-01-07 NOTE — PROGRESS NOTE ADULT - SUBJECTIVE AND OBJECTIVE BOX
JOSE TAM 42y Male  MRN#: [id number]   Hospital Day: 3d    SUBJECTIVE  Patient is a 42y old Male who presents with a chief complaint of Acute Pancreatitis (2023 10:46)  Currently admitted to medicine with the primary diagnosis of Acute pancreatitis      INTERVAL HPI AND OVERNIGHT EVENTS:  Patient was examined and seen at bedside. This morning he is resting comfortably in bed and reports no issues or overnight events.    OBJECTIVE  PAST MEDICAL & SURGICAL HISTORY    ALLERGIES:  No Known Allergies    MEDICATIONS:  STANDING MEDICATIONS  atorvastatin 80 milliGRAM(s) Oral at bedtime  chlorhexidine 2% Cloths 1 Application(s) Topical daily  dextrose 5% + sodium chloride 0.9%. 1000 milliLiter(s) IV Continuous <Continuous>  dextrose 50% Injectable 50 milliLiter(s) IV Push every 15 minutes  enoxaparin Injectable 40 milliGRAM(s) SubCutaneous every 24 hours  fenofibrate Tablet 145 milliGRAM(s) Oral daily  insulin regular Infusion 10 Unit(s)/Hr IV Continuous <Continuous>  nicotine -  14 mG/24Hr(s) Patch 1 Patch Transdermal daily  pantoprazole    Tablet 40 milliGRAM(s) Oral before breakfast    PRN MEDICATIONS  acetaminophen     Tablet .. 650 milliGRAM(s) Oral every 6 hours PRN  diphenhydrAMINE Injectable 25 milliGRAM(s) IV Push every 6 hours PRN  morphine  - Injectable 2 milliGRAM(s) IV Push every 6 hours PRN  ondansetron Injectable 4 milliGRAM(s) IV Push every 8 hours PRN      VITAL SIGNS: Last 24 Hours  T(C): 36.9 (2023 04:00), Max: 36.9 (2023 04:00)  T(F): 98.4 (2023 04:00), Max: 98.4 (2023 04:00)  HR: 85 (2023 08:00) (85 - 105)  BP: 109/69 (2023 08:00) (109/69 - 152/80)  BP(mean): 85 (2023 08:00) (81 - 94)  RR: 22 (2023 08:00) (3 - 24)  SpO2: 93% (2023 08:00) (91% - 97%)    LABS:                        13.0   9.08  )-----------( 175      ( 2023 04:48 )             39.6         138  |  102  |  6<L>  ----------------------------<  129<H>  3.9   |  25  |  0.8    Ca    8.5      2023 04:48  Mg     2.0         TPro  6.0  /  Alb  3.4<L>  /  TBili  0.8  /  DBili  x   /  AST  16  /  ALT  18  /  AlkPhos  78  07    PT/INR - ( 2023 04:54 )   PT: 12.40 sec;   INR: 1.08 ratio         PTT - ( 2023 04:54 )  PTT:28.4 sec  Urinalysis Basic - ( 2023 15:57 )    Color: Yellow / Appearance: Clear / S.009 / pH: x  Gluc: x / Ketone: Small  / Bili: Negative / Urobili: 6 mg/dL   Blood: x / Protein: Trace / Nitrite: Negative   Leuk Esterase: Negative / RBC: x / WBC x   Sq Epi: x / Non Sq Epi: x / Bacteria: x        Lactate, Blood: 0.7 mmol/L (23 @ 04:48)  Lactate, Blood: 0.8 mmol/L (23 @ 19:48)      Culture - Urine (collected 2023 15:57)  Source: Clean Catch Clean Catch (Midstream)  Final Report (2023 20:43):    <10,000 CFU/mL Normal Urogenital [place]    Culture - Blood (collected 2023 11:23)  Source: .Blood None  Preliminary Report (2023 19:02):    No growth to date.    Culture - Blood (collected 2023 11:23)  Source: .Blood None  Preliminary Report (2023 20:01):    No growth to date.          RADIOLOGY:      PHYSICAL EXAM:  CONSTITUTIONAL: No acute distress, well-developed, well-groomed, AAOx3  HEAD: Atraumatic, normocephalic  EYES: EOM intact, PERRLA, conjunctiva and sclera clear  ENT: Supple, no masses, no thyromegaly, no bruits, no JVD; moist mucous membranes  PULMONARY: Clear to auscultation bilaterally; no wheezes, rales, or rhonchi  CARDIOVASCULAR: Regular rate and rhythm; no murmurs, rubs, or gallops  GASTROINTESTINAL: Soft, non-tender, non-distended; bowel sounds present  MUSCULOSKELETAL: 2+ peripheral pulses; no clubbing, no cyanosis, no edema  NEUROLOGY: non-focal  SKIN: No rashes or lesions; warm and dry

## 2023-01-07 NOTE — PROGRESS NOTE ADULT - ASSESSMENT
type2 diabetes, switch to sq insulin as per my previous note, and start orals tomorrow., and home on orals.

## 2023-01-07 NOTE — PROGRESS NOTE ADULT - ASSESSMENT
IMPRESSION:    Hypertriglyceridemia-induced pancreatitis  Hypovolemic hyponatremia  Hepatic steatosis  SIRS  Active smoker  HO alcohol use, pancreatitis    PLAN:    CNS: Avoid sedatives. Nicotine patch. Pain control.     HEENT: Oral care    PULMONARY: HOB @ 45 degrees. Aspiration precautions. Counseled patient on smoking cessation.  Keep spo2 more than 92%.     CARDIOVASCULAR: Goal directed fluid resuscitation. Lower IVF to 100cc/hr. Avoid fluid overload.     GI: GI prophylaxis. Feeding: Advance diet as tolerated. Bowel regimen. Trend LFTs. US abdomen done with no evidence of Gallstones. GI following.     RENAL: Follow up renal function and lytes. Correct as needed. Keep K>4 and Mg >2. Check calcium level and Lactic acid level today.     INFECTIOUS DISEASE: Monitor vital signs. Follow up cultures. No indicaitons for abx as of yet.     HEMATOLOGICAL: DVT prophylaxis; keep hgb more than 7; check coags. Check duplex lower extremities given elevated D-dimer.     ENDOCRINE: Start statins and fenofibrate.   Endocrinology evaluation.   Check trig level and if less than 500 than the insulin infusion can be discontinued.   Continue D5LR as long the insulin drip is running.      MUSCULOSKELETAL: Out of bed.     DISPO: Remains in CCU until off insulin infusion.

## 2023-01-07 NOTE — PROGRESS NOTE ADULT - SUBJECTIVE AND OBJECTIVE BOX
Reason for Endocrinology Consult: Diabetes    HPI: 42y Male    DFAMILY HISTORY:      SH:  Smoking  Etoh:  Recreational Drugs:    Home Medications:      Current (Non-Endocrine) Meds:  acetaminophen     Tablet .. 650 milliGRAM(s) Oral every 6 hours PRN  chlorhexidine 2% Cloths 1 Application(s) Topical daily  dextrose 5% + sodium chloride 0.9%. 1000 milliLiter(s) IV Continuous <Continuous>  diphenhydrAMINE Injectable 25 milliGRAM(s) IV Push every 6 hours PRN  enoxaparin Injectable 40 milliGRAM(s) SubCutaneous every 24 hours  morphine  - Injectable 2 milliGRAM(s) IV Push every 6 hours PRN  nicotine -  14 mG/24Hr(s) Patch 1 Patch Transdermal daily  ondansetron Injectable 4 milliGRAM(s) IV Push every 8 hours PRN  pantoprazole    Tablet 40 milliGRAM(s) Oral before breakfast      Current Endocrine Meds:   atorvastatin 80 milliGRAM(s) Oral at bedtime  dextrose 50% Injectable 50 milliLiter(s) IV Push every 15 minutes  fenofibrate Tablet 145 milliGRAM(s) Oral daily  insulin regular Infusion 10 Unit(s)/Hr IV Continuous <Continuous>      Allergies:  No Known Allergies      ROS:  Denies the following except as indicated.    General: weight loss/weight gain, decreased appetite, fatigue, fever  Eyes: blurry vision, double vision  ENT: neck swelling, dysphagia, voice changes   CV: palpitations, SOB, chest pain, cough  GI: nausea, vomiting, diarrhea, constipation, abdominal pain  : nocturia,  polyuria, dysuria  Endo: decreased libido, heat/cold intolerance, jitteriness  MSK: arthralgias, myalgias  Skin: rash, dryness, diaphoresis  Neuro: pedal numbness,pedal paresthesias, pedal pain    Height (cm): 172.7 (01-06 @ 00:50)  Weight (kg): 101.4 (01-06 @ 00:50)  BMI (kg/m2): 34 (01-06 @ 00:50)    Vital Signs Last 24 Hrs  T(C): 36.7 (07 Jan 2023 16:00), Max: 36.9 (07 Jan 2023 04:00)  T(F): 98 (07 Jan 2023 16:00), Max: 98.4 (07 Jan 2023 04:00)  HR: 89 (07 Jan 2023 18:00) (84 - 99)  BP: 127/74 (07 Jan 2023 18:00) (108/69 - 129/75)  BP(mean): 96 (07 Jan 2023 18:00) (81 - 96)  RR: 25 (07 Jan 2023 18:00) (3 - 37)  SpO2: 96% (07 Jan 2023 10:00) (91% - 97%)    Parameters below as of 07 Jan 2023 18:00  Patient On (Oxygen Delivery Method): room air      Constitutional: WN/WD in NAD.   Neck: no thyromegaly or palpable thyroid nodules   Respiratory: lungs CTAB.  Cardiovascular: regular rate and rhythm, normal S1 and S2, no audible murmurs  GI: soft, NT/ND, no masses/HSM appreciated.  Ext: no edema, no ulcers, pedal pulses palpable bilaterally  Neurology: no tremor, monofilament sensation intact in feet  Psychiatric: A&O x 3, normal affect/mood.        LABS:                        13.0   9.08  )-----------( 175      ( 07 Jan 2023 04:48 )             39.6     01-07    138  |  102  |  6<L>  ----------------------------<  129<H>  3.9   |  25  |  0.8    Ca    8.5      07 Jan 2023 04:48  Mg     2.0     01-07    TPro  6.0  /  Alb  3.4<L>  /  TBili  0.8  /  DBili  x   /  AST  16  /  ALT  18  /  AlkPhos  78  01-07    PT/INR - ( 06 Jan 2023 04:54 )   PT: 12.40 sec;   INR: 1.08 ratio         PTT - ( 06 Jan 2023 04:54 )  PTT:28.4 sec                           Thyroid Stimulating Hormone, Serum: 0.43 (01-05 @ 08:42)          RADIOLOGY & ADDITIONAL STUDIES:    A/P:42yMale

## 2023-01-07 NOTE — PROGRESS NOTE ADULT - ASSESSMENT
Hypertriglyceridemia-induced pancreatitis  Hypovolemic hyponatremia  Hepatic steatosis  SIRS  Active smoker  HO alcohol use, pancreatitis    PLAN:    CNS: Avoid sedatives. Nicotine patch. Pain control.     HEENT: Oral care    PULMONARY: HOB @ 45 degrees. Aspiration precautions. Counseled patient on smoking cessation. Repeat CXR today. Keep spo2 more than 92%.     CARDIOVASCULAR: Goal directed fluid resuscitation. Lower IVF to 100cc/hr. Avoid fluid overload.     GI: GI prophylaxis. Feeding: Advance diet as tolerated. Bowel regimen. Trend LFTs. US abdomen done with no evidence of Gallstones. GI following.     RENAL: Follow up renal function and lytes. Correct as needed. Keep K>4 and Mg >2. Check calcium level and Lactic acid level today.     INFECTIOUS DISEASE: Monitor vital signs. Follow up cultures. No indicaitons for abx as of yet.     HEMATOLOGICAL: DVT prophylaxis; keep hgb more than 7; check coags. Check duplex lower extremities given elevated D-dimer.     ENDOCRINE: Start statins and fenofibrate. Endocrinology evaluation. Check trig level and if less than 500 than the insulin infusion can be discontinued. Continue D5LR as long the insulin drip is running.    glargine 30 units daily,  lispro 12 units before meals    MUSCULOSKELETAL: Out of bed.     DISPO: Remains in CCU until off insulin infusion.

## 2023-01-08 LAB
ALBUMIN SERPL ELPH-MCNC: 3.5 G/DL — SIGNIFICANT CHANGE UP (ref 3.5–5.2)
ALP SERPL-CCNC: 75 U/L — SIGNIFICANT CHANGE UP (ref 30–115)
ALT FLD-CCNC: 25 U/L — SIGNIFICANT CHANGE UP (ref 0–41)
ANION GAP SERPL CALC-SCNC: 13 MMOL/L — SIGNIFICANT CHANGE UP (ref 7–14)
AST SERPL-CCNC: 25 U/L — SIGNIFICANT CHANGE UP (ref 0–41)
BASOPHILS # BLD AUTO: 0.02 K/UL — SIGNIFICANT CHANGE UP (ref 0–0.2)
BASOPHILS NFR BLD AUTO: 0.3 % — SIGNIFICANT CHANGE UP (ref 0–1)
BILIRUB SERPL-MCNC: 0.6 MG/DL — SIGNIFICANT CHANGE UP (ref 0.2–1.2)
BUN SERPL-MCNC: 7 MG/DL — LOW (ref 10–20)
CALCIUM SERPL-MCNC: 8.7 MG/DL — SIGNIFICANT CHANGE UP (ref 8.4–10.5)
CALCIUM SERPL-MCNC: 9.1 MG/DL — SIGNIFICANT CHANGE UP (ref 8.4–10.5)
CHLORIDE SERPL-SCNC: 107 MMOL/L — SIGNIFICANT CHANGE UP (ref 98–110)
CO2 SERPL-SCNC: 21 MMOL/L — SIGNIFICANT CHANGE UP (ref 17–32)
CREAT SERPL-MCNC: 0.8 MG/DL — SIGNIFICANT CHANGE UP (ref 0.7–1.5)
EGFR: 113 ML/MIN/1.73M2 — SIGNIFICANT CHANGE UP
EOSINOPHIL # BLD AUTO: 0.18 K/UL — SIGNIFICANT CHANGE UP (ref 0–0.7)
EOSINOPHIL NFR BLD AUTO: 2.3 % — SIGNIFICANT CHANGE UP (ref 0–8)
GLUCOSE BLDC GLUCOMTR-MCNC: 105 MG/DL — HIGH (ref 70–99)
GLUCOSE BLDC GLUCOMTR-MCNC: 106 MG/DL — HIGH (ref 70–99)
GLUCOSE BLDC GLUCOMTR-MCNC: 108 MG/DL — HIGH (ref 70–99)
GLUCOSE BLDC GLUCOMTR-MCNC: 113 MG/DL — HIGH (ref 70–99)
GLUCOSE BLDC GLUCOMTR-MCNC: 113 MG/DL — HIGH (ref 70–99)
GLUCOSE BLDC GLUCOMTR-MCNC: 114 MG/DL — HIGH (ref 70–99)
GLUCOSE BLDC GLUCOMTR-MCNC: 117 MG/DL — HIGH (ref 70–99)
GLUCOSE BLDC GLUCOMTR-MCNC: 118 MG/DL — HIGH (ref 70–99)
GLUCOSE BLDC GLUCOMTR-MCNC: 123 MG/DL — HIGH (ref 70–99)
GLUCOSE BLDC GLUCOMTR-MCNC: 132 MG/DL — HIGH (ref 70–99)
GLUCOSE BLDC GLUCOMTR-MCNC: 141 MG/DL — HIGH (ref 70–99)
GLUCOSE BLDC GLUCOMTR-MCNC: 83 MG/DL — SIGNIFICANT CHANGE UP (ref 70–99)
GLUCOSE SERPL-MCNC: 110 MG/DL — HIGH (ref 70–99)
HCT VFR BLD CALC: 40 % — LOW (ref 42–52)
HGB BLD-MCNC: 13.3 G/DL — LOW (ref 14–18)
IMM GRANULOCYTES NFR BLD AUTO: 0.5 % — HIGH (ref 0.1–0.3)
LACTATE SERPL-SCNC: 0.6 MMOL/L — LOW (ref 0.7–2)
LYMPHOCYTES # BLD AUTO: 2.19 K/UL — SIGNIFICANT CHANGE UP (ref 1.2–3.4)
LYMPHOCYTES # BLD AUTO: 28 % — SIGNIFICANT CHANGE UP (ref 20.5–51.1)
MAGNESIUM SERPL-MCNC: 2.1 MG/DL — SIGNIFICANT CHANGE UP (ref 1.8–2.4)
MCHC RBC-ENTMCNC: 29.8 PG — SIGNIFICANT CHANGE UP (ref 27–31)
MCHC RBC-ENTMCNC: 33.3 G/DL — SIGNIFICANT CHANGE UP (ref 32–37)
MCV RBC AUTO: 89.5 FL — SIGNIFICANT CHANGE UP (ref 80–94)
MONOCYTES # BLD AUTO: 0.58 K/UL — SIGNIFICANT CHANGE UP (ref 0.1–0.6)
MONOCYTES NFR BLD AUTO: 7.4 % — SIGNIFICANT CHANGE UP (ref 1.7–9.3)
NEUTROPHILS # BLD AUTO: 4.81 K/UL — SIGNIFICANT CHANGE UP (ref 1.4–6.5)
NEUTROPHILS NFR BLD AUTO: 61.5 % — SIGNIFICANT CHANGE UP (ref 42.2–75.2)
NRBC # BLD: 0 /100 WBCS — SIGNIFICANT CHANGE UP (ref 0–0)
PLATELET # BLD AUTO: 190 K/UL — SIGNIFICANT CHANGE UP (ref 130–400)
POTASSIUM SERPL-MCNC: 3.8 MMOL/L — SIGNIFICANT CHANGE UP (ref 3.5–5)
POTASSIUM SERPL-SCNC: 3.8 MMOL/L — SIGNIFICANT CHANGE UP (ref 3.5–5)
PROT SERPL-MCNC: 6.1 G/DL — SIGNIFICANT CHANGE UP (ref 6–8)
RBC # BLD: 4.47 M/UL — LOW (ref 4.7–6.1)
RBC # FLD: 12.8 % — SIGNIFICANT CHANGE UP (ref 11.5–14.5)
SODIUM SERPL-SCNC: 141 MMOL/L — SIGNIFICANT CHANGE UP (ref 135–146)
TRIGL SERPL-MCNC: 463 MG/DL — HIGH
TRIGL SERPL-MCNC: 517 MG/DL — HIGH
WBC # BLD: 7.82 K/UL — SIGNIFICANT CHANGE UP (ref 4.8–10.8)
WBC # FLD AUTO: 7.82 K/UL — SIGNIFICANT CHANGE UP (ref 4.8–10.8)

## 2023-01-08 PROCEDURE — 99233 SBSQ HOSP IP/OBS HIGH 50: CPT

## 2023-01-08 RX ORDER — INSULIN LISPRO 100/ML
VIAL (ML) SUBCUTANEOUS
Refills: 0 | Status: DISCONTINUED | OUTPATIENT
Start: 2023-01-08 | End: 2023-01-11

## 2023-01-08 RX ORDER — INSULIN GLARGINE 100 [IU]/ML
30 INJECTION, SOLUTION SUBCUTANEOUS ONCE
Refills: 0 | Status: COMPLETED | OUTPATIENT
Start: 2023-01-08 | End: 2023-01-08

## 2023-01-08 RX ORDER — INSULIN LISPRO 100/ML
12 VIAL (ML) SUBCUTANEOUS
Refills: 0 | Status: DISCONTINUED | OUTPATIENT
Start: 2023-01-08 | End: 2023-01-11

## 2023-01-08 RX ORDER — INSULIN GLARGINE 100 [IU]/ML
30 INJECTION, SOLUTION SUBCUTANEOUS AT BEDTIME
Refills: 0 | Status: DISCONTINUED | OUTPATIENT
Start: 2023-01-08 | End: 2023-01-11

## 2023-01-08 RX ADMIN — Medication 145 MILLIGRAM(S): at 12:28

## 2023-01-08 RX ADMIN — SODIUM CHLORIDE 100 MILLILITER(S): 9 INJECTION, SOLUTION INTRAVENOUS at 02:20

## 2023-01-08 RX ADMIN — PANTOPRAZOLE SODIUM 40 MILLIGRAM(S): 20 TABLET, DELAYED RELEASE ORAL at 05:18

## 2023-01-08 RX ADMIN — CHLORHEXIDINE GLUCONATE 1 APPLICATION(S): 213 SOLUTION TOPICAL at 12:28

## 2023-01-08 RX ADMIN — ENOXAPARIN SODIUM 40 MILLIGRAM(S): 100 INJECTION SUBCUTANEOUS at 12:28

## 2023-01-08 RX ADMIN — Medication 25 MILLIGRAM(S): at 05:03

## 2023-01-08 RX ADMIN — Medication 12 UNIT(S): at 12:38

## 2023-01-08 RX ADMIN — INSULIN GLARGINE 30 UNIT(S): 100 INJECTION, SOLUTION SUBCUTANEOUS at 11:36

## 2023-01-08 RX ADMIN — ATORVASTATIN CALCIUM 80 MILLIGRAM(S): 80 TABLET, FILM COATED ORAL at 22:15

## 2023-01-08 NOTE — CHART NOTE - NSCHARTNOTEFT_GEN_A_CORE
HPI:  42-year-old male with history of pancreatitis, social drinker presents to the ED accompanied by sister complaining of bandlike abdominal pain, 8/10 radiation to the back that started yesterday afternoon. Patient states pain got worse today.  Patient attempted to take pantoprazole with no relief so came to the ED.    Pt is a social drinker and last drink was on new years hiren.    ED vitals:   / 86, HR 78, RR 18, O2 sat Temp 97.8, on RA satting well    Sig labs:   white count 14k, lipase 125, Na 126    CT abd/ pelvis:   1.  Findings compatible with acute interstitial edematous pancreatitis;   no evidence of necrosis or peripancreatic fluid collections.  2.  Hepatic steatosis and hepatomegaly.    Pt given pain meds and fluids in the ED (05 Jan 2023 00:31)    REVIEW OF SYSTEMS:   see HPI  OBJECTIVE:  ICU Vital Signs Last 24 Hrs  T(C): 36.5 (08 Jan 2023 08:00), Max: 37.4 (07 Jan 2023 20:00)  T(F): 97.7 (08 Jan 2023 08:00), Max: 99.4 (07 Jan 2023 20:00)  HR: 80 (08 Jan 2023 09:00) (78 - 99)  BP: 102/65 (08 Jan 2023 09:00) (102/65 - 133/78)  BP(mean): 79 (08 Jan 2023 09:00) (79 - 97)    RR: 22 (08 Jan 2023 09:00) (15 - 37)  SpO2: 95% (08 Jan 2023 09:00) (89% - 98%)    O2 Parameters below as of 08 Jan 2023 09:00  Patient On (Oxygen Delivery Method): nasal cannula  O2 Flow (L/min): 2      01-07 @ 07:01  -  01-08 @ 07:00  --------------------------------------------------------  IN: 2564 mL / OUT: 1200 mL / NET: 1364 mL    01-08 @ 07:01  -  01-08 @ 09:36  --------------------------------------------------------  IN: 104 mL / OUT: 0 mL / NET: 104 mL      CAPILLARY BLOOD GLUCOSE    POCT Blood Glucose.: 114 mg/dL (08 Jan 2023 09:19)    PHYSICAL EXAM:     ENMT:   Airway patent,   Nasal mucosa clear.  Mouth with normal mucosa.   No thrush    · EYES:   Clear bilaterally,   pupils equal,   round and reactive to light.    · CARDIAC:   Normal rate,   regular rhythm.    Heart sounds S1, S2.   No murmurs, no rubs or gallops on auscultation  no edema        CAROTID:   normal systolic impulse  no bruits    · RESPIRATORY:   rales  normal chest expansion  no retractions or use of accessory muscles  percussion of chest demonstrates no hyperresonance or dullness    · GASTROINTESTINAL:  Abdomen soft,   non-tender,   + BS  liver/spleen not palpable    · MUSCULOSKELETAL:   no clubbing, cyanosis    · SKIN:   Skin normal color for race,   warm, dry   No evidence of rash.    · HEME LYMPH:   no splenomegaly.  No cervical  lymphadenopathy.  no inguinal lymphadenopathy    HOSPITAL MEDICATIONS:  MEDICATIONS  (STANDING):  atorvastatin 80 milliGRAM(s) Oral at bedtime  chlorhexidine 2% Cloths 1 Application(s) Topical daily  dextrose 50% Injectable 50 milliLiter(s) IV Push every 15 minutes  enoxaparin Injectable 40 milliGRAM(s) SubCutaneous every 24 hours  fenofibrate Tablet 145 milliGRAM(s) Oral daily  insulin glargine Injectable (LANTUS) 30 Unit(s) SubCutaneous at bedtime  insulin glargine Injectable (LANTUS) 30 Unit(s) SubCutaneous once  insulin lispro (ADMELOG) corrective regimen sliding scale   SubCutaneous three times a day before meals  insulin lispro Injectable (ADMELOG) 12 Unit(s) SubCutaneous three times a day before meals  insulin regular Infusion 10 Unit(s)/Hr (10 mL/Hr) IV Continuous <Continuous>  nicotine -  14 mG/24Hr(s) Patch 1 Patch Transdermal daily  pantoprazole    Tablet 40 milliGRAM(s) Oral before breakfast    MEDICATIONS  (PRN):  acetaminophen     Tablet .. 650 milliGRAM(s) Oral every 6 hours PRN Temp greater or equal to 38C (100.4F), Mild Pain (1 - 3)  diphenhydrAMINE Injectable 25 milliGRAM(s) IV Push every 6 hours PRN facial redness and swelling  morphine  - Injectable 2 milliGRAM(s) IV Push every 6 hours PRN Severe Pain (7 - 10)  ondansetron Injectable 4 milliGRAM(s) IV Push every 8 hours PRN Nausea and/or Vomiting    lactated ringers: 1000 milliLiter(s)      with potassium chloride additive 20 milliEquivalent(s), infuse at 150 mL/Hr  lactated ringers: 1000 milliLiter(s)      with potassium chloride additive 20 milliEquivalent(s), infuse at 125 mL/Hr  lactated ringers.: Solution, 1000 milliLiter(s) infuse at 200 mL/Hr  lactated ringers Bolus:   1000 milliLiter(s), IV Bolus, once, infuse over 60 Minute(s), Stop After 1 Doses  Provider's Contact #: [phone]  sodium chloride 0.9% Bolus:   1000 milliLiter(s), IV Bolus, once, infuse over 1 Hour(s), Stop After 1 Doses      LABS:                        13.3   7.82  )-----------( 190      ( 08 Jan 2023 04:33 )             40.0     01-08    141  |  107  |  7<L>  ----------------------------<  110<H>  3.8   |  21  |  0.8    Ca    8.7      08 Jan 2023 04:33  Mg     2.1     01-08    TPro  6.1  /  Alb  3.5  /  TBili  0.6  /  DBili  x   /  AST  25  /  ALT  25  /  AlkPhos  75  01-08      COVID-19 PCR: NotDetec (04 Jan 2023 17:55)        RADIOLOGY: Today I personally interpreted the latest CXR and other pertinent films.      Assessment and Plan:   · Assessment	  IMPRESSION:    Hypertriglyceridemia-induced pancreatitis  Hypovolemic hyponatremia  Hepatic steatosis  SIRS  Active smoker  HO alcohol use, pancreatitis    PLAN:    CNS: Avoid sedatives. Nicotine patch. Pain control.     HEENT: Oral care    PULMONARY: HOB @ 45 degrees. Aspiration precautions. Counseled patient on smoking cessation.  Keep spo2 more than 92%.     CARDIOVASCULAR: Goal directed fluid resuscitation. Lower IVF to 100cc/hr. Avoid fluid overload.     GI: GI prophylaxis. Feeding: Advance diet as tolerated. Bowel regimen. Trend LFTs. US abdomen done with no evidence of Gallstones. GI following.     RENAL: Follow up renal function and lytes. Correct as needed. Keep K>4 and Mg >2. Check calcium level and Lactic acid level today.     INFECTIOUS DISEASE: Monitor vital signs. Follow up cultures. No indications for abx as of yet.     HEMATOLOGICAL: DVT prophylaxis; keep hgb more than 7; check coags. Check duplex lower extremities given elevated D-dimer.     ENDOCRINE: Start statins and fenofibrate.   Endocrinology management. Follow plans   the insulin infusion can be discontinued.      MUSCULOSKELETAL: Out of bed.     DISPO: can go to [facility] HPI:  42-year-old male with history of pancreatitis, social drinker presents to the ED accompanied by sister complaining of bandlike abdominal pain, 8/10 radiation to the back that started yesterday afternoon. Patient states pain got worse today.  Patient attempted to take pantoprazole with no relief so came to the ED.    Pt is a social drinker and last drink was on new years hiren.    ED vitals:   / 86, HR 78, RR 18, O2 sat Temp 97.8, on RA satting well    Sig labs:   white count 14k, lipase 125, Na 126    CT abd/ pelvis:   1.  Findings compatible with acute interstitial edematous pancreatitis;   no evidence of necrosis or peripancreatic fluid collections.  2.  Hepatic steatosis and hepatomegaly.    Pt given pain meds and fluids in the ED (05 Jan 2023 00:31)    REVIEW OF SYSTEMS:   see HPI  OBJECTIVE:  ICU Vital Signs Last 24 Hrs  T(C): 36.5 (08 Jan 2023 08:00), Max: 37.4 (07 Jan 2023 20:00)  T(F): 97.7 (08 Jan 2023 08:00), Max: 99.4 (07 Jan 2023 20:00)  HR: 80 (08 Jan 2023 09:00) (78 - 99)  BP: 102/65 (08 Jan 2023 09:00) (102/65 - 133/78)  BP(mean): 79 (08 Jan 2023 09:00) (79 - 97)    RR: 22 (08 Jan 2023 09:00) (15 - 37)  SpO2: 95% (08 Jan 2023 09:00) (89% - 98%)    O2 Parameters below as of 08 Jan 2023 09:00  Patient On (Oxygen Delivery Method): nasal cannula  O2 Flow (L/min): 2      01-07 @ 07:01  -  01-08 @ 07:00  --------------------------------------------------------  IN: 2564 mL / OUT: 1200 mL / NET: 1364 mL    01-08 @ 07:01  -  01-08 @ 09:36  --------------------------------------------------------  IN: 104 mL / OUT: 0 mL / NET: 104 mL      CAPILLARY BLOOD GLUCOSE    POCT Blood Glucose.: 114 mg/dL (08 Jan 2023 09:19)    PHYSICAL EXAM:     ENMT:   Airway patent,   Nasal mucosa clear.  Mouth with normal mucosa.   No thrush    EYES:   Clear bilaterally,   pupils equal,   round and reactive to light.    · CARDIAC:   Normal rate,   regular rhythm.    Heart sounds S1, S2.   No murmurs, no rubs or gallops on auscultation  no edema        CAROTID:   normal systolic impulse  no bruits    · RESPIRATORY:   rales  normal chest expansion  no retractions or use of accessory muscles  percussion of chest demonstrates no hyperresonance or dullness    · GASTROINTESTINAL:  Abdomen soft,   non-tender,   + BS  liver/spleen not palpable    · MUSCULOSKELETAL:   no clubbing, cyanosis    · SKIN:   Skin normal color for race,   warm, dry   No evidence of rash.    · HEME LYMPH:   no splenomegaly.  No cervical  lymphadenopathy.  no inguinal lymphadenopathy    HOSPITAL MEDICATIONS:  MEDICATIONS  (STANDING):  atorvastatin 80 milliGRAM(s) Oral at bedtime  chlorhexidine 2% Cloths 1 Application(s) Topical daily  dextrose 50% Injectable 50 milliLiter(s) IV Push every 15 minutes  enoxaparin Injectable 40 milliGRAM(s) SubCutaneous every 24 hours  fenofibrate Tablet 145 milliGRAM(s) Oral daily  insulin glargine Injectable (LANTUS) 30 Unit(s) SubCutaneous at bedtime  insulin glargine Injectable (LANTUS) 30 Unit(s) SubCutaneous once  insulin lispro (ADMELOG) corrective regimen sliding scale   SubCutaneous three times a day before meals  insulin lispro Injectable (ADMELOG) 12 Unit(s) SubCutaneous three times a day before meals  insulin regular Infusion 10 Unit(s)/Hr (10 mL/Hr) IV Continuous <Continuous>  nicotine -  14 mG/24Hr(s) Patch 1 Patch Transdermal daily  pantoprazole    Tablet 40 milliGRAM(s) Oral before breakfast    MEDICATIONS  (PRN):  acetaminophen     Tablet .. 650 milliGRAM(s) Oral every 6 hours PRN Temp greater or equal to 38C (100.4F), Mild Pain (1 - 3)  diphenhydrAMINE Injectable 25 milliGRAM(s) IV Push every 6 hours PRN facial redness and swelling  morphine  - Injectable 2 milliGRAM(s) IV Push every 6 hours PRN Severe Pain (7 - 10)  ondansetron Injectable 4 milliGRAM(s) IV Push every 8 hours PRN Nausea and/or Vomiting    lactated ringers: 1000 milliLiter(s)      with potassium chloride additive 20 milliEquivalent(s), infuse at 150 mL/Hr  lactated ringers: 1000 milliLiter(s)      with potassium chloride additive 20 milliEquivalent(s), infuse at 125 mL/Hr  lactated ringers.: Solution, 1000 milliLiter(s) infuse at 200 mL/Hr  lactated ringers Bolus:   1000 milliLiter(s), IV Bolus, once, infuse over 60 Minute(s), Stop After 1 Doses  Provider's Contact #: [phone]  sodium chloride 0.9% Bolus:   1000 milliLiter(s), IV Bolus, once, infuse over 1 Hour(s), Stop After 1 Doses      LABS:                        13.3   7.82  )-----------( 190      ( 08 Jan 2023 04:33 )             40.0     01-08    141  |  107  |  7<L>  ----------------------------<  110<H>  3.8   |  21  |  0.8    Ca    8.7      08 Jan 2023 04:33  Mg     2.1     01-08    TPro  6.1  /  Alb  3.5  /  TBili  0.6  /  DBili  x   /  AST  25  /  ALT  25  /  AlkPhos  75  01-08      COVID-19 PCR: NotDetec (04 Jan 2023 17:55)        RADIOLOGY: Today I personally interpreted the latest CXR and other pertinent films.      Assessment and Plan:   Assessment	  IMPRESSION:    Hypertriglyceridemia-induced pancreatitis  Hypovolemic hyponatremia  Hepatic steatosis  SIRS  Active smoker  HO alcohol use, pancreatitis    PLAN:    CNS: Avoid sedatives. Nicotine patch. Pain control.     HEENT: Oral care    PULMONARY: HOB @ 45 degrees. Aspiration precautions. Counseled patient on smoking cessation.  Keep spo2 more than 92%.     CARDIOVASCULAR: Goal directed fluid resuscitation. Lower IVF to 100cc/hr. Avoid fluid overload.     GI: GI prophylaxis. Feeding: Advance diet as tolerated. Bowel regimen. Trend LFTs. US abdomen done with no evidence of Gallstones. GI following.     RENAL: Follow up renal function and lytes. Correct as needed. Keep K>4 and Mg >2. Check calcium level and Lactic acid level today.     INFECTIOUS DISEASE: Monitor vital signs. Follow up cultures. No indications for abx as of yet.     HEMATOLOGICAL: DVT prophylaxis; keep hgb more than 7; check coags. Check duplex lower extremities given elevated D-dimer.     ENDOCRINE: Start statins and fenofibrate.   Endocrinology management. Follow plans   the insulin infusion can be discontinued.      MUSCULOSKELETAL: Out of bed.     DISPO: can go to [facility] HPI:  42-year-old male with history of pancreatitis, social drinker presents to the ED accompanied by sister complaining of bandlike abdominal pain, 8/10 radiation to the back that started yesterday afternoon. Patient states pain got worse today.  Patient attempted to take pantoprazole with no relief so came to the ED. Pt is a social drinker and last drink was on new years hiren. In the ED vitals were  / 86, HR 78, RR 18, O2 sat Temp 97.8, on RA satting well. Pt had significant labs of a white count 14k, lipase 125, Na 126. Pt had a CT abd/ pelvis with significant findings compatible with acute interstitial edematous pancreatitis;   no evidence of necrosis or peripancreatic fluid collections and hepatic steatosis and hepatomegaly. Pt was given pain meds and fluids in the ED. Pt was admitted.    In the CCU, pt was treated for           CAPILLARY BLOOD GLUCOSE    POCT Blood Glucose.: 114 mg/dL (08 Jan 2023 09:19)    PHYSICAL EXAM:     ENMT:   Airway patent,   Nasal mucosa clear.  Mouth with normal mucosa.   No thrush    EYES:   Clear bilaterally,   pupils equal,   round and reactive to light.    · CARDIAC:   Normal rate,   regular rhythm.    Heart sounds S1, S2.   No murmurs, no rubs or gallops on auscultation  no edema        CAROTID:   normal systolic impulse  no bruits    · RESPIRATORY:   rales  normal chest expansion  no retractions or use of accessory muscles  percussion of chest demonstrates no hyperresonance or dullness    · GASTROINTESTINAL:  Abdomen soft,   non-tender,   + BS  liver/spleen not palpable    · MUSCULOSKELETAL:   no clubbing, cyanosis    · SKIN:   Skin normal color for race,   warm, dry   No evidence of rash.    · HEME LYMPH:   no splenomegaly.  No cervical  lymphadenopathy.  no inguinal lymphadenopathy    HOSPITAL MEDICATIONS:  MEDICATIONS  (STANDING):  atorvastatin 80 milliGRAM(s) Oral at bedtime  chlorhexidine 2% Cloths 1 Application(s) Topical daily  dextrose 50% Injectable 50 milliLiter(s) IV Push every 15 minutes  enoxaparin Injectable 40 milliGRAM(s) SubCutaneous every 24 hours  fenofibrate Tablet 145 milliGRAM(s) Oral daily  insulin glargine Injectable (LANTUS) 30 Unit(s) SubCutaneous at bedtime  insulin glargine Injectable (LANTUS) 30 Unit(s) SubCutaneous once  insulin lispro (ADMELOG) corrective regimen sliding scale   SubCutaneous three times a day before meals  insulin lispro Injectable (ADMELOG) 12 Unit(s) SubCutaneous three times a day before meals  insulin regular Infusion 10 Unit(s)/Hr (10 mL/Hr) IV Continuous <Continuous>  nicotine -  14 mG/24Hr(s) Patch 1 Patch Transdermal daily  pantoprazole    Tablet 40 milliGRAM(s) Oral before breakfast    MEDICATIONS  (PRN):  acetaminophen     Tablet .. 650 milliGRAM(s) Oral every 6 hours PRN Temp greater or equal to 38C (100.4F), Mild Pain (1 - 3)  diphenhydrAMINE Injectable 25 milliGRAM(s) IV Push every 6 hours PRN facial redness and swelling  morphine  - Injectable 2 milliGRAM(s) IV Push every 6 hours PRN Severe Pain (7 - 10)  ondansetron Injectable 4 milliGRAM(s) IV Push every 8 hours PRN Nausea and/or Vomiting    lactated ringers: 1000 milliLiter(s)      with potassium chloride additive 20 milliEquivalent(s), infuse at 150 mL/Hr  lactated ringers: 1000 milliLiter(s)      with potassium chloride additive 20 milliEquivalent(s), infuse at 125 mL/Hr  lactated ringers.: Solution, 1000 milliLiter(s) infuse at 200 mL/Hr  lactated ringers Bolus:   1000 milliLiter(s), IV Bolus, once, infuse over 60 Minute(s), Stop After 1 Doses  Provider's Contact #: [phone]  sodium chloride 0.9% Bolus:   1000 milliLiter(s), IV Bolus, once, infuse over 1 Hour(s), Stop After 1 Doses      LABS:                        13.3   7.82  )-----------( 190      ( 08 Jan 2023 04:33 )             40.0     01-08    141  |  107  |  7<L>  ----------------------------<  110<H>  3.8   |  21  |  0.8    Ca    8.7      08 Jan 2023 04:33  Mg     2.1     01-08    TPro  6.1  /  Alb  3.5  /  TBili  0.6  /  DBili  x   /  AST  25  /  ALT  25  /  AlkPhos  75  01-08      COVID-19 PCR: Manfred (04 Jan 2023 17:55)        RADIOLOGY: Today I personally interpreted the latest CXR and other pertinent films.      Assessment and Plan:   Assessment	  IMPRESSION:    Hypertriglyceridemia-induced pancreatitis  Hypovolemic hyponatremia  Hepatic steatosis  SIRS  Active smoker  HO alcohol use, pancreatitis    PLAN:    CNS: Avoid sedatives. Nicotine patch. Pain control.     HEENT: Oral care    PULMONARY: HOB @ 45 degrees. Aspiration precautions. Counseled patient on smoking cessation.  Keep spo2 more than 92%.     CARDIOVASCULAR: Goal directed fluid resuscitation. Lower IVF to 100cc/hr. Avoid fluid overload.     GI: GI prophylaxis. Feeding: Advance diet as tolerated. Bowel regimen. Trend LFTs. US abdomen done with no evidence of Gallstones. GI following.     RENAL: Follow up renal function and lytes. Correct as needed. Keep K>4 and Mg >2. Check calcium level and Lactic acid level today.     INFECTIOUS DISEASE: Monitor vital signs. Follow up cultures. No indications for abx as of yet.     HEMATOLOGICAL: DVT prophylaxis; keep hgb more than 7; check coags. Check duplex lower extremities given elevated D-dimer.     ENDOCRINE: Start statins and fenofibrate.   Endocrinology management. Follow plans   the insulin infusion can be discontinued.      MUSCULOSKELETAL: Out of bed.     DISPO: can go to [facility] HPI:  42-year-old male with history of pancreatitis, social drinker presents to the ED accompanied by sister complaining of bandlike abdominal pain, 8/10 radiation to the back that started yesterday afternoon. Patient states pain got worse today.  Patient attempted to take pantoprazole with no relief so came to the ED. Pt is a social drinker and last drink was on new years hiren. In the ED vitals were  / 86, HR 78, RR 18, O2 sat Temp 97.8, on RA satting well. Pt had significant labs of a white count 14k, lipase 125, Na 126. Pt had a CT abd/ pelvis with significant findings compatible with acute interstitial edematous pancreatitis;   no evidence of necrosis or peripancreatic fluid collections and hepatic steatosis and hepatomegaly. Pt was given pain meds and fluids in the ED. Pt was admitted.    In the CCU, pt was treated for Acute Moderately-Severe Interstitial Pancreatitis d/t Hypertriglyceridemia. Pt was hemodynamically stable with no evidence of Sepsis. Pt lipase 124, LFTs WNL. Pt found to be hyperglycemic and elevated serum triglycerides on admission. Pt was given maintenance fluid therapy to avoid volume overload. Endocrinology and Gastroenterology consulted. Per recommendations, pt was started on insulin drip at 0.1-0.3 unit/kg/hr and was weaned off once Triglyceride fell below 500 and started on Lantus. Pt with improvement of lab values and symptoms. Pt developed back rash and lip swelling secondary to starting atorvostatin. Atorvostatin was discontinued. Pt went  from NPO to clear liquid diet to full diet. Pt ready for downgrade to floor.    Assessment:  IMPRESSION:    Hypertriglyceridemia-induced pancreatitis  Hypovolemic hyponatremia  Hepatic steatosis  SIRS  Active smoker  HO alcohol use, pancreatitis    PLAN:    CNS: Avoid sedatives. Nicotine patch. Pain control.     HEENT: Oral care    PULMONARY: HOB @ 45 degrees. Aspiration precautions. Counseled patient on smoking cessation.  Keep spo2 more than 92%.     CARDIOVASCULAR: Goal directed fluid resuscitation. Lower IVF to 100cc/hr. Avoid fluid overload.     GI: GI prophylaxis. Feeding: Advance diet as tolerated. Bowel regimen. Trend LFTs. US abdomen done with no evidence of Gallstones. GI following.     RENAL: Follow up renal function and lytes. Correct as needed. Keep K>4 and Mg >2. Check calcium level and Lactic acid level today.     INFECTIOUS DISEASE: Monitor vital signs. Follow up cultures. No indications for abx as of yet.     HEMATOLOGICAL: DVT prophylaxis; keep hgb more than 7; check coags. Check duplex lower extremities given elevated D-dimer.     ENDOCRINE: Start statins and fenofibrate.   Endocrinology management. Follow plans   the insulin infusion can be discontinued.    MUSCULOSKELETAL: Out of bed.     DISPO: can go to [facility] HPI:  42-year-old male with history of pancreatitis, social drinker presents to the ED accompanied by sister complaining of bandlike abdominal pain, 8/10 radiation to the back that started yesterday afternoon. Patient states pain got worse today.  Patient attempted to take pantoprazole with no relief so came to the ED. Pt is a social drinker and last drink was on new years hiren. In the ED vitals were  / 86, HR 78, RR 18, O2 sat Temp 97.8, on RA satting well. Pt had significant labs of a white count 14k, lipase 125, Na 126. Pt had a CT abd/ pelvis with significant findings compatible with acute interstitial edematous pancreatitis;   no evidence of necrosis or peripancreatic fluid collections and hepatic steatosis and hepatomegaly. Pt was given pain meds and fluids in the ED. Pt was admitted.    In the CCU, pt was treated for Acute Moderately-Severe Interstitial Pancreatitis d/t Hypertriglyceridemia. Pt was hemodynamically stable with no evidence of Sepsis. Pt lipase 124, LFTs WNL. Pt found to be hyperglycemic and elevated serum triglycerides on admission. Pt was given maintenance fluid therapy to avoid volume overload. Endocrinology and Gastroenterology consulted. Per recommendations, pt was started on insulin drip at 0.1-0.3 unit/kg/hr and was weaned off once Triglyceride fell below 500 and started on Lantus. Pt with improvement of lab values and symptoms. Pt developed back rash and lip swelling secondary to starting atorvostatin. Atorvostatin was discontinued. Pt went  from NPO to clear liquid diet to regular diet. Pt ready for downgrade to floor.    Assessment:  IMPRESSION:    Hypertriglyceridemia-induced pancreatitis  Hypovolemic hyponatremia  Hepatic steatosis  SIRS  Active smoker  HO alcohol use, pancreatitis    PLAN:    CNS: Avoid sedatives. Nicotine patch. Pain control.     HEENT: Oral care    PULMONARY: HOB @ 45 degrees. Aspiration precautions. Counseled patient on smoking cessation.  Keep spo2 more than 92%.     CARDIOVASCULAR: Goal directed fluid resuscitation. Lower IVF to 100cc/hr. Avoid fluid overload.     GI: GI prophylaxis. Feeding: Advance diet as tolerated. Bowel regimen. Trend LFTs. US abdomen done with no evidence of Gallstones. GI following.     RENAL: Follow up renal function and lytes. Correct as needed. Keep K>4 and Mg >2. Check calcium level and Lactic acid level today.     INFECTIOUS DISEASE: Monitor vital signs. Follow up cultures. No indications for abx as of yet.     HEMATOLOGICAL: DVT prophylaxis; keep hgb more than 7; check coags. Check duplex lower extremities given elevated D-dimer.     ENDOCRINE: Start statins and fenofibrate.   Endocrinology management. Follow plans   the insulin infusion can be discontinued.    MUSCULOSKELETAL: Out of bed.     DISPO: can go to [facility]

## 2023-01-08 NOTE — PROGRESS NOTE ADULT - ASSESSMENT
IMPRESSION:    Hypertriglyceridemia-induced pancreatitis  Hypovolemic hyponatremia  Hepatic steatosis  SIRS  Active smoker  HO alcohol use, pancreatitis    PLAN:    CNS: Avoid sedatives. Nicotine patch. Pain control.     HEENT: Oral care    PULMONARY: HOB @ 45 degrees. Aspiration precautions. Counseled patient on smoking cessation.  Keep spo2 more than 92%.     CARDIOVASCULAR: Goal directed fluid resuscitation. Lower IVF to 100cc/hr. Avoid fluid overload.     GI: GI prophylaxis. Feeding: Advance diet as tolerated. Bowel regimen. Trend LFTs. US abdomen done with no evidence of Gallstones. GI following.     RENAL: Follow up renal function and lytes. Correct as needed. Keep K>4 and Mg >2. Check calcium level and Lactic acid level today.     INFECTIOUS DISEASE: Monitor vital signs. Follow up cultures. No indicaitons for abx as of yet.     HEMATOLOGICAL: DVT prophylaxis; keep hgb more than 7; check coags. Check duplex lower extremities given elevated D-dimer.     ENDOCRINE: Start statins and fenofibrate.   Endocrinology management. Follow plans   the insulin infusion can be discontinued.      MUSCULOSKELETAL: Out of bed.     DISPO: can go to [facility]

## 2023-01-08 NOTE — PROGRESS NOTE ADULT - SUBJECTIVE AND OBJECTIVE BOX
Patient is a 42y old  Male who presents with a chief complaint of acutepancreatitis, [id number]. (07 Jan 2023 18:45)        HPI:  42-year-old male with history of pancreatitis, social drinker presents to the ED accompanied by sister complaining of bandlike abdominal pain, 8/10 radiation to the back that started yesterday afternoon. Patient states pain got worse today.  Patient attempted to take pantoprazole with no relief so came to the ED.    Pt is a social drinker and last drink was on new years hiren.    ED vitals:   / 86, HR 78, RR 18, O2 sat Temp 97.8, on RA satting well    Sig labs:   white count 14k, lipase 125, Na 126    CT abd/ pelvis:   1.  Findings compatible with acute interstitial edematous pancreatitis;   no evidence of necrosis or peripancreatic fluid collections.  2.  Hepatic steatosis and hepatomegaly.    Pt given pain meds and fluids in the ED (05 Jan 2023 00:31)      Pt evaluated on rounds.  I reviewed the radiology tests and hospital record.    I reviewed previous notes on this patient.    Interval Events: No overnight events.      REVIEW OF SYSTEMS:   see HPI      OBJECTIVE:  ICU Vital Signs Last 24 Hrs  T(C): 36.5 (08 Jan 2023 08:00), Max: 37.4 (07 Jan 2023 20:00)  T(F): 97.7 (08 Jan 2023 08:00), Max: 99.4 (07 Jan 2023 20:00)  HR: 80 (08 Jan 2023 09:00) (78 - 99)  BP: 102/65 (08 Jan 2023 09:00) (102/65 - 133/78)  BP(mean): 79 (08 Jan 2023 09:00) (79 - 97)    RR: 22 (08 Jan 2023 09:00) (15 - 37)  SpO2: 95% (08 Jan 2023 09:00) (89% - 98%)    O2 Parameters below as of 08 Jan 2023 09:00  Patient On (Oxygen Delivery Method): nasal cannula  O2 Flow (L/min): 2      01-07 @ 07:01  -  01-08 @ 07:00  --------------------------------------------------------  IN: 2564 mL / OUT: 1200 mL / NET: 1364 mL    01-08 @ 07:01  -  01-08 @ 09:36  --------------------------------------------------------  IN: 104 mL / OUT: 0 mL / NET: 104 mL      CAPILLARY BLOOD GLUCOSE      POCT Blood Glucose.: 114 mg/dL (08 Jan 2023 09:19)        PHYSICAL EXAM:       · ENMT:   Airway patent,   Nasal mucosa clear.  Mouth with normal mucosa.   No thrush    · EYES:   Clear bilaterally,   pupils equal,   round and reactive to light.    · CARDIAC:   Normal rate,   regular rhythm.    Heart sounds S1, S2.   No murmurs, no rubs or gallops on auscultation  no edema        CAROTID:   normal systolic impulse  no bruits    · RESPIRATORY:   rales  normal chest expansion  no retractions or use of accessory muscles  percussion of chest demonstrates no hyperresonance or dullness    · GASTROINTESTINAL:  Abdomen soft,   non-tender,   + BS  liver/spleen not palpable    · MUSCULOSKELETAL:   no clubbing, cyanosis      · SKIN:   Skin normal color for race,   warm, dry   No evidence of rash.        · HEME LYMPH:   no splenomegaly.  No cervical  lymphadenopathy.  no inguinal lymphadenopathy    HOSPITAL MEDICATIONS:  MEDICATIONS  (STANDING):  atorvastatin 80 milliGRAM(s) Oral at bedtime  chlorhexidine 2% Cloths 1 Application(s) Topical daily  dextrose 50% Injectable 50 milliLiter(s) IV Push every 15 minutes  enoxaparin Injectable 40 milliGRAM(s) SubCutaneous every 24 hours  fenofibrate Tablet 145 milliGRAM(s) Oral daily  insulin glargine Injectable (LANTUS) 30 Unit(s) SubCutaneous at bedtime  insulin glargine Injectable (LANTUS) 30 Unit(s) SubCutaneous once  insulin lispro (ADMELOG) corrective regimen sliding scale   SubCutaneous three times a day before meals  insulin lispro Injectable (ADMELOG) 12 Unit(s) SubCutaneous three times a day before meals  insulin regular Infusion 10 Unit(s)/Hr (10 mL/Hr) IV Continuous <Continuous>  nicotine -  14 mG/24Hr(s) Patch 1 Patch Transdermal daily  pantoprazole    Tablet 40 milliGRAM(s) Oral before breakfast    MEDICATIONS  (PRN):  acetaminophen     Tablet .. 650 milliGRAM(s) Oral every 6 hours PRN Temp greater or equal to 38C (100.4F), Mild Pain (1 - 3)  diphenhydrAMINE Injectable 25 milliGRAM(s) IV Push every 6 hours PRN facial redness and swelling  morphine  - Injectable 2 milliGRAM(s) IV Push every 6 hours PRN Severe Pain (7 - 10)  ondansetron Injectable 4 milliGRAM(s) IV Push every 8 hours PRN Nausea and/or Vomiting    lactated ringers: 1000 milliLiter(s)      with potassium chloride additive 20 milliEquivalent(s), infuse at 150 mL/Hr  lactated ringers: 1000 milliLiter(s)      with potassium chloride additive 20 milliEquivalent(s), infuse at 125 mL/Hr  lactated ringers.: Solution, 1000 milliLiter(s) infuse at 200 mL/Hr  lactated ringers Bolus:   1000 milliLiter(s), IV Bolus, once, infuse over 60 Minute(s), Stop After 1 Doses  Provider's Contact #: [phone]  sodium chloride 0.9% Bolus:   1000 milliLiter(s), IV Bolus, once, infuse over 1 Hour(s), Stop After 1 Doses      LABS:                        13.3   7.82  )-----------( 190      ( 08 Jan 2023 04:33 )             40.0     01-08    141  |  107  |  7<L>  ----------------------------<  110<H>  3.8   |  21  |  0.8    Ca    8.7      08 Jan 2023 04:33  Mg     2.1     01-08    TPro  6.1  /  Alb  3.5  /  TBili  0.6  /  DBili  x   /  AST  25  /  ALT  25  /  AlkPhos  75  01-08      COVID-19 PCR: NotDetec (04 Jan 2023 17:55)            RADIOLOGY: Today I personally interpreted the latest CXR and other pertinent films.

## 2023-01-09 LAB
ALBUMIN SERPL ELPH-MCNC: 3.7 G/DL — SIGNIFICANT CHANGE UP (ref 3.5–5.2)
ALP SERPL-CCNC: 86 U/L — SIGNIFICANT CHANGE UP (ref 30–115)
ALT FLD-CCNC: 36 U/L — SIGNIFICANT CHANGE UP (ref 0–41)
ANION GAP SERPL CALC-SCNC: 12 MMOL/L — SIGNIFICANT CHANGE UP (ref 7–14)
AST SERPL-CCNC: 38 U/L — SIGNIFICANT CHANGE UP (ref 0–41)
BILIRUB SERPL-MCNC: 0.6 MG/DL — SIGNIFICANT CHANGE UP (ref 0.2–1.2)
BUN SERPL-MCNC: 9 MG/DL — LOW (ref 10–20)
CALCIUM SERPL-MCNC: 9 MG/DL — SIGNIFICANT CHANGE UP (ref 8.4–10.5)
CHLORIDE SERPL-SCNC: 104 MMOL/L — SIGNIFICANT CHANGE UP (ref 98–110)
CO2 SERPL-SCNC: 24 MMOL/L — SIGNIFICANT CHANGE UP (ref 17–32)
CREAT SERPL-MCNC: 0.8 MG/DL — SIGNIFICANT CHANGE UP (ref 0.7–1.5)
EGFR: 113 ML/MIN/1.73M2 — SIGNIFICANT CHANGE UP
GLUCOSE BLDC GLUCOMTR-MCNC: 103 MG/DL — HIGH (ref 70–99)
GLUCOSE BLDC GLUCOMTR-MCNC: 111 MG/DL — HIGH (ref 70–99)
GLUCOSE BLDC GLUCOMTR-MCNC: 111 MG/DL — HIGH (ref 70–99)
GLUCOSE BLDC GLUCOMTR-MCNC: 121 MG/DL — HIGH (ref 70–99)
GLUCOSE BLDC GLUCOMTR-MCNC: 165 MG/DL — HIGH (ref 70–99)
GLUCOSE SERPL-MCNC: 104 MG/DL — HIGH (ref 70–99)
HCT VFR BLD CALC: 40 % — LOW (ref 42–52)
HGB BLD-MCNC: 13.3 G/DL — LOW (ref 14–18)
MAGNESIUM SERPL-MCNC: 2 MG/DL — SIGNIFICANT CHANGE UP (ref 1.8–2.4)
MCHC RBC-ENTMCNC: 29.5 PG — SIGNIFICANT CHANGE UP (ref 27–31)
MCHC RBC-ENTMCNC: 33.3 G/DL — SIGNIFICANT CHANGE UP (ref 32–37)
MCV RBC AUTO: 88.7 FL — SIGNIFICANT CHANGE UP (ref 80–94)
NRBC # BLD: 0 /100 WBCS — SIGNIFICANT CHANGE UP (ref 0–0)
PLATELET # BLD AUTO: 195 K/UL — SIGNIFICANT CHANGE UP (ref 130–400)
POTASSIUM SERPL-MCNC: 3.7 MMOL/L — SIGNIFICANT CHANGE UP (ref 3.5–5)
POTASSIUM SERPL-SCNC: 3.7 MMOL/L — SIGNIFICANT CHANGE UP (ref 3.5–5)
PROT SERPL-MCNC: 6.2 G/DL — SIGNIFICANT CHANGE UP (ref 6–8)
RBC # BLD: 4.51 M/UL — LOW (ref 4.7–6.1)
RBC # FLD: 12.6 % — SIGNIFICANT CHANGE UP (ref 11.5–14.5)
SODIUM SERPL-SCNC: 140 MMOL/L — SIGNIFICANT CHANGE UP (ref 135–146)
TRIGL SERPL-MCNC: 428 MG/DL — HIGH
WBC # BLD: 7.2 K/UL — SIGNIFICANT CHANGE UP (ref 4.8–10.8)
WBC # FLD AUTO: 7.2 K/UL — SIGNIFICANT CHANGE UP (ref 4.8–10.8)

## 2023-01-09 PROCEDURE — 99232 SBSQ HOSP IP/OBS MODERATE 35: CPT | Mod: GC

## 2023-01-09 PROCEDURE — 71045 X-RAY EXAM CHEST 1 VIEW: CPT | Mod: 26

## 2023-01-09 RX ADMIN — Medication 12 UNIT(S): at 12:00

## 2023-01-09 RX ADMIN — ENOXAPARIN SODIUM 40 MILLIGRAM(S): 100 INJECTION SUBCUTANEOUS at 11:48

## 2023-01-09 RX ADMIN — Medication 2: at 11:59

## 2023-01-09 RX ADMIN — PANTOPRAZOLE SODIUM 40 MILLIGRAM(S): 20 TABLET, DELAYED RELEASE ORAL at 06:13

## 2023-01-09 RX ADMIN — INSULIN GLARGINE 30 UNIT(S): 100 INJECTION, SOLUTION SUBCUTANEOUS at 22:06

## 2023-01-09 RX ADMIN — Medication 25 MILLIGRAM(S): at 00:22

## 2023-01-09 RX ADMIN — Medication 145 MILLIGRAM(S): at 11:48

## 2023-01-09 RX ADMIN — CHLORHEXIDINE GLUCONATE 1 APPLICATION(S): 213 SOLUTION TOPICAL at 12:01

## 2023-01-09 RX ADMIN — Medication 12 UNIT(S): at 07:36

## 2023-01-09 NOTE — PROGRESS NOTE ADULT - ASSESSMENT
Assessment	  IMPRESSION:    Hypertriglyceridemia-induced pancreatitis  Hypovolemic hyponatremia  Hepatic steatosis  SIRS  Active smoker  HO alcohol use, pancreatitis    PLAN:    CNS: Avoid sedatives. Nicotine patch. Pain control.     HEENT: Oral care    PULMONARY: HOB @ 45 degrees. Aspiration precautions. Counseled patient on smoking cessation.  Keep spo2 more than 92%.     CARDIOVASCULAR: Goal directed fluid resuscitation. d/c IVF. Avoid fluid overload.     GI: GI prophylaxis. Feeding: Advance diet as tolerated. Bowel regimen. Trend LFTs. US abdomen done with no evidence of Gallstones. GI following.     RENAL: Follow up renal function and lytes. Correct as needed. Keep K>4 and Mg >2. Check calcium level and Lactic acid level today.     INFECTIOUS DISEASE: Monitor vital signs. Follow up cultures. No indicaitons for abx as of yet.     HEMATOLOGICAL: DVT prophylaxis; keep hgb more than 7; check coags. Check duplex lower extremities given elevated D-dimer.     ENDOCRINE: d/c statin due to reaction and fenofibrate.   Endocrinology management. Follow plans      MUSCULOSKELETAL: Out of bed. PT/OT    DISPO: can go to [facility]

## 2023-01-09 NOTE — PROGRESS NOTE ADULT - ASSESSMENT
IMPRESSION:    Hypertriglyceridemia-induced pancreatitis  Hypovolemic hyponatremia  Hepatic steatosis  SIRS  Active smoker  HO alcohol use, pancreatitis    PLAN:    CNS: Avoid sedatives. Nicotine patch. Pain control.     HEENT: Oral care    PULMONARY: HOB @ 45 degrees. Aspiration precautions. Counseled patient on smoking cessation.  Keep spo2 more than 92%.     CARDIOVASCULAR: Goal directed fluid resuscitation. d/c IVF. Avoid fluid overload.     GI: GI prophylaxis. Feeding: Advance diet as tolerated. Bowel regimen. Trend LFTs. US abdomen done with no evidence of Gallstones. GI following.     RENAL: Follow up renal function and lytes. Correct as needed. Keep K>4 and Mg >2. Check calcium level and Lactic acid level today.     INFECTIOUS DISEASE: Monitor vital signs. Follow up cultures. No indicaitons for abx as of yet.     HEMATOLOGICAL: DVT prophylaxis; keep hgb more than 7; check coags. Check duplex lower extremities given elevated D-dimer.     ENDOCRINE: d/c statin due to reaction and fenofibrate.   Endocrinology management. Follow plans      MUSCULOSKELETAL: Out of bed. PT/OT    DISPO: can go to [facility]

## 2023-01-09 NOTE — CHART NOTE - NSCHARTNOTEFT_GEN_A_CORE
Patient developed swollen lips after administration of atorvastatin. Two days ago he had rash on his back after the atorvastatin.  I gave Benadryl and discontinued Atorvastatin.

## 2023-01-09 NOTE — PROGRESS NOTE ADULT - SUBJECTIVE AND OBJECTIVE BOX
SUBJECTIVE:  Patient is a 42y old Male who presents with a chief complaint of acutepancreatitis, [id number]. (07 Jan 2023 18:45)   Acute pancreatitis     Today is hospital day 5d. There are no new issues or overnight events. Pt had allergic reaction of back rash and lip swelling after starting atorvostatin 3 days ago. No respiratory distress. Atorvostatin discontinued.      HPI  HPI:  42-year-old male with history of pancreatitis, social drinker presents to the ED accompanied by sister complaining of bandlike abdominal pain, 8/10 radiation to the back that started yesterday afternoon. Patient states pain got worse today.  Patient attempted to take pantoprazole with no relief so came to the ED.    Pt is a social drinker and last drink was on new years hiren.    ED vitals:   / 86, HR 78, RR 18, O2 sat Temp 97.8, on RA satting well    Sig labs:   white count 14k, lipase 125, Na 126    CT abd/ pelvis:   1.  Findings compatible with acute interstitial edematous pancreatitis;   no evidence of necrosis or peripancreatic fluid collections.  2.  Hepatic steatosis and hepatomegaly.    Pt given pain meds and fluids in the ED (05 Jan 2023 00:31)      PAST MEDICAL & SURGICAL HISTORY    ALLERGIES:  atorvastatin (Other; Rash)    MEDICATIONS:  HOME MEDICATIONS    STANDING MEDICATIONS  chlorhexidine 2% Cloths 1 Application(s) Topical daily  dextrose 50% Injectable 50 milliLiter(s) IV Push every 15 minutes  enoxaparin Injectable 40 milliGRAM(s) SubCutaneous every 24 hours  fenofibrate Tablet 145 milliGRAM(s) Oral daily  insulin glargine Injectable (LANTUS) 30 Unit(s) SubCutaneous at bedtime  insulin lispro (ADMELOG) corrective regimen sliding scale   SubCutaneous three times a day before meals  insulin lispro Injectable (ADMELOG) 12 Unit(s) SubCutaneous three times a day before meals  nicotine -  14 mG/24Hr(s) Patch 1 Patch Transdermal daily  pantoprazole    Tablet 40 milliGRAM(s) Oral before breakfast    PRN MEDICATIONS  acetaminophen     Tablet .. 650 milliGRAM(s) Oral every 6 hours PRN  diphenhydrAMINE Injectable 25 milliGRAM(s) IV Push every 6 hours PRN  morphine  - Injectable 2 milliGRAM(s) IV Push every 6 hours PRN  ondansetron Injectable 4 milliGRAM(s) IV Push every 8 hours PRN    VITALS:   T(C): 36.7 (01-09-23 @ 08:00), Max: 36.9 (01-09-23 @ 00:00)  T(F): 98 (01-09-23 @ 08:00), Max: 98.4 (01-09-23 @ 00:00)  HR: 87 (01-09-23 @ 12:00) (69 - 88)  BP: 125/74 (01-09-23 @ 12:00) (119/72 - 136/74)  BP(mean): 81 (01-09-23 @ 08:00) (81 - 106)  ABP: --  ABP(mean): --  RR: 20 (01-09-23 @ 12:00) (16 - 24)  SpO2: 97% (01-09-23 @ 12:00) (92% - 98%)  LABS:                        13.3   7.20  )-----------( 195      ( 09 Jan 2023 04:26 )             40.0     01-09    140  |  104  |  9<L>  ----------------------------<  104<H>  3.7   |  24  |  0.8    Ca    9.0      09 Jan 2023 04:26  Mg     2.0     01-09    TPro  6.2  /  Alb  3.7  /  TBili  0.6  /  DBili  x   /  AST  38  /  ALT  36  /  AlkPhos  86  01-09        I&O's Detail    08 Jan 2023 07:01  -  09 Jan 2023 07:00  --------------------------------------------------------  IN:    dextrose 5% + sodium chloride 0.9%: 100 mL    Insulin: 10 mL    Oral Fluid: 796 mL  Total IN: 906 mL    OUT:    Voided (mL): 810 mL  Total OUT: 810 mL    Total NET: 96 mL                    RADIOLOGY:  EKG  12 Lead ECG:   Ventricular Rate 112 BPM    Atrial Rate 112 BPM    P-R Interval 146 ms    QRS Duration 84 ms    Q-T Interval 320 ms    QTC Calculation(Bazett) 436 ms    P Axis 34 degrees    R Axis 92 degrees    T Axis -10 degrees    Diagnosis Line Sinus tachycardia  Rightward axis  T wave abnormality, consider inferior ischemia  Abnormal ECG    Confirmed by PARKER CAMACHO, [facility] ([id number]) on 1/4/2023 11:03:37 PM (01-04-23 @ 20:48)    Xray Chest 1 View- PORTABLE-Routine:   ACC: [id number] EXAM:  XR CHEST PORTABLE ROUTINE 1V                          PROCEDURE DATE:  01/09/2023          INTERPRETATION:  Clinical History / Reason for exam: Follow-up    Comparison : Chest radiograph January 7, 2023.    Technique/Positioning: Frontal chest radiograph.    Findings:    Support devices: None.    Cardiac/mediastinum/hilum: Unchanged.    Lung parenchyma/Pleura: Left basilar atelectasis. No pneumothorax.    Skeleton/soft tissues: Unchanged.    Impression:    No radiographic evidence of acute cardiopulmonary disease.        --- End of Report ---            TAMMY CONNELLY MD; Attending Radiologist  This document has been electronically signed. Jan 9 2023 12:47PM (01-09-23 @ 06:00)  Xray Chest 1 View- PORTABLE-Routine:   ACC: [id number] EXAM:  XR CHEST PORTABLE ROUTINE 1V                          PROCEDURE DATE:  01/07/2023          INTERPRETATION:  Clinical History / Reason for exam: Abdominal pain    Comparison : Chest radiograph 1/6/2023.    Technique/Positioning:Single portable AP radiograph of the chest..    Findings:    Support devices: None.    Cardiac/mediastinum/hilum: Unchanged. Vascular crowding, likely on the   basis of low lung volumes.    Lung parenchyma/Pleura: Low lung volumes without definite focal   consolidation, pleural effusion or pneumothorax.    Skeleton/soft tissues: Unchanged    Impression:    Unchanged low lung volumes.  No definite radiographic evidence of acute cardiopulmonary disease.        --- End of Report ---            CASH CAMACHO; Attending Radiologist  This document has been electronically signed. Jan 7 2023  1:32PM (01-07-23 @ 06:02)  Xray Chest 1 View- PORTABLE-Urgent:   ACC: [id number] EXAM:  XR CHEST PORTABLE URGENT 1V                          PROCEDURE DATE:  01/06/2023          INTERPRETATION:  Clinical History / Reason for exam: Hypertriglyceridemia    Comparison : Chest radiograph 1/4/2023.    Technique/Positioning: Single AP chest radiograph.    Findings:    Support devices: None.    Cardiac/mediastinum/hilum: Unremarkable.    Lung parenchyma/Pleura: Low lung volumes with no focal consolidation,   effusion or pneumothorax.    Skeleton/soft tissues: Unremarkable.    Impression:    No radiographic evidence of acute cardiopulmonary disease.        --- End of Report ---            GINA ROBLES MD; Attending Radiologist  This document has been electronically signed. Jan 6 2023  8:24AM (01-06-23 @ 07:51)  Xray Chest 1 View- PORTABLE-Urgent:   ACC: [id number] EXAM:  XR CHEST PORTABLE URGENT 1V                          PROCEDURE DATE:  01/04/2023          INTERPRETATION:  Clinical History / Reason for exam: Abdominal pain    Comparison : Chest radiograph None.    Technique/Positioning: Single AP view of the chest.    Findings:    Support devices: None.    Cardiac/mediastinum/hilum: Unremarkable.    Lung parenchyma/Pleura: Within normal limits.    Skeleton/soft tissues: No acute osseous abnormality    Impression:    No radiographic evidence of acute cardiopulmonary disease.        --- End of Report ---            ELLIOT LANDAU MD; Attending Radiologist  This document has been electronically signed. Jan 5 2023  8:39AM (01-04-23 @ 18:33)    Physical Exam:  General: no acute distress, lying in bed  Head: normocephalic and atraumatic  Neck: supple  Heart: regular rate and rhythm, S1 and S2 normal, no murmurs, rubs or gallops noted on exam  Lungs: Symmetric chest expansion bilaterally, clear lungs bilaterally, no wheezes rhonchi or crackles heard  Abdomen: Bowel sounds present, non tender on light and deep palpation  Extremities: No edema, no clubbing or cyanosis notes, positive peripheral pulses

## 2023-01-09 NOTE — PROGRESS NOTE ADULT - SUBJECTIVE AND OBJECTIVE BOX
Patient is a 42y old  Male who presents with a chief complaint of acutepancreatitis, [id number]. (07 Jan 2023 18:45)        Over Night Events:        ROS:     All ROS are negative except HPI           PHYSICAL EXAM    ICU Vital Signs Last 24 Hrs  T(C): 36.7 (09 Jan 2023 08:00), Max: 36.9 (09 Jan 2023 00:00)  T(F): 98 (09 Jan 2023 08:00), Max: 98.4 (09 Jan 2023 00:00)  HR: 69 (09 Jan 2023 08:00) (69 - 86)  BP: 121/79 (09 Jan 2023 08:00) (102/65 - 139/110)  BP(mean): 81 (09 Jan 2023 08:00) (79 - 117)  ABP: --  ABP(mean): --  RR: 18 (09 Jan 2023 08:00) (16 - 24)  SpO2: 94% (09 Jan 2023 08:00) (92% - 98%)    O2 Parameters below as of 09 Jan 2023 08:00  Patient On (Oxygen Delivery Method): nasal cannula  O2 Flow (L/min): 2          Constitutional: no acute distress, well nourished well developed  Neuro: moving all 4 limbs spontaneously, no facial droop or dysarthria  HEENT: NCAT, anicteric  Neck: no visible lymphadenopathy or goiter  Pulm: no respiratory distress. clear to auscultation bilaterally  Cardiac: extremities appear pink and well-perfused.  regular rhythm and rate, no murmur detected  Abdomen: non-distended  Extremities: no peripheral edema      01-08-23 @ 07:01  -  01-09-23 @ 07:00  --------------------------------------------------------  IN:    dextrose 5% + sodium chloride 0.9%: 100 mL    Insulin: 10 mL    Oral Fluid: 796 mL  Total IN: 906 mL    OUT:    Voided (mL): 810 mL  Total OUT: 810 mL    Total NET: 96 mL          LABS:                            13.3   7.20  )-----------( 195      ( 09 Jan 2023 04:26 )             40.0                                               01-09    140  |  104  |  9<L>  ----------------------------<  104<H>  3.7   |  24  |  0.8    Ca    9.0      09 Jan 2023 04:26  Mg     2.0     01-09    TPro  6.2  /  Alb  3.7  /  TBili  0.6  /  DBili  x   /  AST  38  /  ALT  36  /  AlkPhos  86  01-09                                                                                           LIVER FUNCTIONS - ( 09 Jan 2023 04:26 )  Alb: 3.7 g/dL / Pro: 6.2 g/dL / ALK PHOS: 86 U/L / ALT: 36 U/L / AST: 38 U/L / GGT: x                                                                                                                                       MEDICATIONS  (STANDING):  chlorhexidine 2% Cloths 1 Application(s) Topical daily  dextrose 50% Injectable 50 milliLiter(s) IV Push every 15 minutes  enoxaparin Injectable 40 milliGRAM(s) SubCutaneous every 24 hours  fenofibrate Tablet 145 milliGRAM(s) Oral daily  insulin glargine Injectable (LANTUS) 30 Unit(s) SubCutaneous at bedtime  insulin lispro (ADMELOG) corrective regimen sliding scale   SubCutaneous three times a day before meals  insulin lispro Injectable (ADMELOG) 12 Unit(s) SubCutaneous three times a day before meals  nicotine -  14 mG/24Hr(s) Patch 1 Patch Transdermal daily  pantoprazole    Tablet 40 milliGRAM(s) Oral before breakfast    MEDICATIONS  (PRN):  acetaminophen     Tablet .. 650 milliGRAM(s) Oral every 6 hours PRN Temp greater or equal to 38C (100.4F), Mild Pain (1 - 3)  diphenhydrAMINE Injectable 25 milliGRAM(s) IV Push every 6 hours PRN facial redness and swelling  morphine  - Injectable 2 milliGRAM(s) IV Push every 6 hours PRN Severe Pain (7 - 10)  ondansetron Injectable 4 milliGRAM(s) IV Push every 8 hours PRN Nausea and/or Vomiting      New X-rays reviewed:       ECHO reviewed    CXR interpreted by me:    1/9 images and reads reviewed, by my read mild LLL infiltrate, possibly rotational

## 2023-01-10 ENCOUNTER — TRANSCRIPTION ENCOUNTER (OUTPATIENT)
Age: 43
End: 2023-01-10

## 2023-01-10 LAB
ALBUMIN SERPL ELPH-MCNC: 4 G/DL — SIGNIFICANT CHANGE UP (ref 3.5–5.2)
ALBUMIN SERPL ELPH-MCNC: 4.3 G/DL — SIGNIFICANT CHANGE UP (ref 3.5–5.2)
ALP SERPL-CCNC: 93 U/L — SIGNIFICANT CHANGE UP (ref 30–115)
ALT FLD-CCNC: 60 U/L — HIGH (ref 0–41)
ALT FLD-CCNC: 72 U/L — HIGH (ref 0–41)
ANION GAP SERPL CALC-SCNC: 12 MMOL/L — SIGNIFICANT CHANGE UP (ref 7–14)
ANION GAP SERPL CALC-SCNC: 9 MMOL/L — SIGNIFICANT CHANGE UP (ref 7–14)
AST SERPL-CCNC: 68 U/L — HIGH (ref 0–41)
AST SERPL-CCNC: 69 U/L — HIGH (ref 0–41)
BILIRUB SERPL-MCNC: 0.4 MG/DL — SIGNIFICANT CHANGE UP (ref 0.2–1.2)
BILIRUB SERPL-MCNC: 0.5 MG/DL — SIGNIFICANT CHANGE UP (ref 0.2–1.2)
BUN SERPL-MCNC: 11 MG/DL — SIGNIFICANT CHANGE UP (ref 10–20)
BUN SERPL-MCNC: 12 MG/DL — SIGNIFICANT CHANGE UP (ref 10–20)
CALCIUM SERPL-MCNC: 9.2 MG/DL — SIGNIFICANT CHANGE UP (ref 8.4–10.4)
CALCIUM SERPL-MCNC: 9.6 MG/DL — SIGNIFICANT CHANGE UP (ref 8.4–10.5)
CHLORIDE SERPL-SCNC: 101 MMOL/L — SIGNIFICANT CHANGE UP (ref 98–110)
CHLORIDE SERPL-SCNC: 104 MMOL/L — SIGNIFICANT CHANGE UP (ref 98–110)
CO2 SERPL-SCNC: 23 MMOL/L — SIGNIFICANT CHANGE UP (ref 17–32)
CO2 SERPL-SCNC: 28 MMOL/L — SIGNIFICANT CHANGE UP (ref 17–32)
CREAT SERPL-MCNC: 0.9 MG/DL — SIGNIFICANT CHANGE UP (ref 0.7–1.5)
CREAT SERPL-MCNC: 1 MG/DL — SIGNIFICANT CHANGE UP (ref 0.7–1.5)
CULTURE RESULTS: SIGNIFICANT CHANGE UP
CULTURE RESULTS: SIGNIFICANT CHANGE UP
EGFR: 109 ML/MIN/1.73M2 — SIGNIFICANT CHANGE UP
EGFR: 96 ML/MIN/1.73M2 — SIGNIFICANT CHANGE UP
GLUCOSE BLDC GLUCOMTR-MCNC: 106 MG/DL — HIGH (ref 70–99)
GLUCOSE BLDC GLUCOMTR-MCNC: 150 MG/DL — HIGH (ref 70–99)
GLUCOSE BLDC GLUCOMTR-MCNC: 175 MG/DL — HIGH (ref 70–99)
GLUCOSE BLDC GLUCOMTR-MCNC: 99 MG/DL — SIGNIFICANT CHANGE UP (ref 70–99)
GLUCOSE SERPL-MCNC: 102 MG/DL — HIGH (ref 70–99)
GLUCOSE SERPL-MCNC: 114 MG/DL — HIGH (ref 70–99)
HCT VFR BLD CALC: 42.1 % — SIGNIFICANT CHANGE UP (ref 42–52)
HCT VFR BLD CALC: 42.5 % — SIGNIFICANT CHANGE UP (ref 42–52)
HGB BLD-MCNC: 13.9 G/DL — LOW (ref 14–18)
HGB BLD-MCNC: 14.1 G/DL — SIGNIFICANT CHANGE UP (ref 14–18)
MAGNESIUM SERPL-MCNC: 2 MG/DL — SIGNIFICANT CHANGE UP (ref 1.8–2.4)
MAGNESIUM SERPL-MCNC: 2.1 MG/DL — SIGNIFICANT CHANGE UP (ref 1.8–2.4)
MCHC RBC-ENTMCNC: 29.3 PG — SIGNIFICANT CHANGE UP (ref 27–31)
MCHC RBC-ENTMCNC: 29.3 PG — SIGNIFICANT CHANGE UP (ref 27–31)
MCHC RBC-ENTMCNC: 33 G/DL — SIGNIFICANT CHANGE UP (ref 32–37)
MCHC RBC-ENTMCNC: 33.2 G/DL — SIGNIFICANT CHANGE UP (ref 32–37)
MCV RBC AUTO: 88.2 FL — SIGNIFICANT CHANGE UP (ref 80–94)
MCV RBC AUTO: 88.8 FL — SIGNIFICANT CHANGE UP (ref 80–94)
NRBC # BLD: 0 /100 WBCS — SIGNIFICANT CHANGE UP (ref 0–0)
NRBC # BLD: 0 /100 WBCS — SIGNIFICANT CHANGE UP (ref 0–0)
PLATELET # BLD AUTO: 216 K/UL — SIGNIFICANT CHANGE UP (ref 130–400)
PLATELET # BLD AUTO: 270 K/UL — SIGNIFICANT CHANGE UP (ref 130–400)
POTASSIUM SERPL-MCNC: 4.3 MMOL/L — SIGNIFICANT CHANGE UP (ref 3.5–5)
POTASSIUM SERPL-MCNC: 4.3 MMOL/L — SIGNIFICANT CHANGE UP (ref 3.5–5)
POTASSIUM SERPL-SCNC: 4.3 MMOL/L — SIGNIFICANT CHANGE UP (ref 3.5–5)
POTASSIUM SERPL-SCNC: 4.3 MMOL/L — SIGNIFICANT CHANGE UP (ref 3.5–5)
PROT SERPL-MCNC: 6.9 G/DL — SIGNIFICANT CHANGE UP (ref 6–8)
PROT SERPL-MCNC: 7.4 G/DL — SIGNIFICANT CHANGE UP (ref 6–8)
RBC # BLD: 4.74 M/UL — SIGNIFICANT CHANGE UP (ref 4.7–6.1)
RBC # BLD: 4.82 M/UL — SIGNIFICANT CHANGE UP (ref 4.7–6.1)
RBC # FLD: 12.4 % — SIGNIFICANT CHANGE UP (ref 11.5–14.5)
RBC # FLD: 12.5 % — SIGNIFICANT CHANGE UP (ref 11.5–14.5)
SODIUM SERPL-SCNC: 136 MMOL/L — SIGNIFICANT CHANGE UP (ref 135–146)
SODIUM SERPL-SCNC: 141 MMOL/L — SIGNIFICANT CHANGE UP (ref 135–146)
SPECIMEN SOURCE: SIGNIFICANT CHANGE UP
SPECIMEN SOURCE: SIGNIFICANT CHANGE UP
WBC # BLD: 7.79 K/UL — SIGNIFICANT CHANGE UP (ref 4.8–10.8)
WBC # BLD: 9.14 K/UL — SIGNIFICANT CHANGE UP (ref 4.8–10.8)
WBC # FLD AUTO: 7.79 K/UL — SIGNIFICANT CHANGE UP (ref 4.8–10.8)
WBC # FLD AUTO: 9.14 K/UL — SIGNIFICANT CHANGE UP (ref 4.8–10.8)

## 2023-01-10 PROCEDURE — 99232 SBSQ HOSP IP/OBS MODERATE 35: CPT

## 2023-01-10 RX ORDER — METFORMIN HYDROCHLORIDE 850 MG/1
1 TABLET ORAL
Qty: 60 | Refills: 2
Start: 2023-01-10 | End: 2023-04-09

## 2023-01-10 RX ORDER — PANTOPRAZOLE SODIUM 20 MG/1
1 TABLET, DELAYED RELEASE ORAL
Qty: 0 | Refills: 0 | DISCHARGE
Start: 2023-01-10

## 2023-01-10 RX ORDER — FENOFIBRATE,MICRONIZED 130 MG
1 CAPSULE ORAL
Qty: 0 | Refills: 0 | DISCHARGE
Start: 2023-01-10

## 2023-01-10 RX ORDER — PIOGLITAZONE HYDROCHLORIDE 15 MG/1
1 TABLET ORAL
Qty: 30 | Refills: 2
Start: 2023-01-10 | End: 2023-04-09

## 2023-01-10 RX ADMIN — INSULIN GLARGINE 30 UNIT(S): 100 INJECTION, SOLUTION SUBCUTANEOUS at 23:19

## 2023-01-10 RX ADMIN — Medication 12 UNIT(S): at 11:09

## 2023-01-10 RX ADMIN — ENOXAPARIN SODIUM 40 MILLIGRAM(S): 100 INJECTION SUBCUTANEOUS at 13:24

## 2023-01-10 RX ADMIN — PANTOPRAZOLE SODIUM 40 MILLIGRAM(S): 20 TABLET, DELAYED RELEASE ORAL at 06:12

## 2023-01-10 RX ADMIN — Medication 1 PATCH: at 11:09

## 2023-01-10 RX ADMIN — CHLORHEXIDINE GLUCONATE 1 APPLICATION(S): 213 SOLUTION TOPICAL at 11:10

## 2023-01-10 RX ADMIN — Medication 12 UNIT(S): at 17:06

## 2023-01-10 RX ADMIN — Medication 2: at 17:07

## 2023-01-10 RX ADMIN — Medication 145 MILLIGRAM(S): at 11:09

## 2023-01-10 NOTE — DISCHARGE NOTE NURSING/CASE MANAGEMENT/SOCIAL WORK - PATIENT PORTAL LINK FT
You can access the FollowMyHealth Patient Portal offered by [facility] by registering at the following website: [email or web address]. By joining [facility]’s FollowMyHealth portal, you will also be able to view your health information using other applications (apps) compatible with our system.

## 2023-01-10 NOTE — DISCHARGE NOTE PROVIDER - INSTRUCTIONS
Low fat diet  Please make an appointment to see the endocrinologist and primary care doctor within 1-2 weeks from hospital discharge.

## 2023-01-10 NOTE — DISCHARGE NOTE PROVIDER - NSDCMRMEDTOKEN_GEN_ALL_CORE_FT
fenofibrate 145 mg oral tablet: 1 tab(s) orally once a day  metFORMIN 850 mg oral tablet: 1 tab(s) orally 2 times a day (after breakfast and dinner).   pantoprazole 40 mg oral delayed release tablet: 1 tab(s) orally once a day (before a meal)  pioglitazone 30 mg oral tablet: 1 tab(s) orally once a day    alcohol swabs : Apply topically to affected area 4 times a day   fenofibrate 145 mg oral tablet: 1 tab(s) orally once a day  Fish Oil 1600 mg/5 mL oral liquid: 6.25 milliliter(s) orally once a day   Glucagon Emergency Kit for Low Blood Sugar 1 mg injection: 1 milligram(s) intramuscular once as needed for severe hypoglycemia, then call 911.  glucometer (per patient&#x27;s insurance): Test blood sugars four times a day. Dispense #1 glucometer.  insulin glargine 100 units/mL subcutaneous solution: 30 unit(s) subcutaneous once a day (at bedtime)  insulin lispro 100 units/mL injectable solution: 10 unit(s) injectable 3 times a day (with meals)  Insulin Pen Needles, 4mm: 1 application subcutaneously 4 times a day. ** Use with insulin pen **   lancets: 1 application subcutaneously 4 times a day   metFORMIN 850 mg oral tablet: 1 tab(s) orally 2 times a day (after breakfast and dinner).   pantoprazole 40 mg oral delayed release tablet: 1 tab(s) orally once a day (before a meal)  pioglitazone 30 mg oral tablet: 1 tab(s) orally once a day   test strips (per patient&#x27;s insurance): 1 application subcutaneously 4 times a day. ** Compatible with patient&#x27;s glucometer **  Zetia 10 mg oral tablet: 1 tab(s) orally once a day

## 2023-01-10 NOTE — DISCHARGE NOTE NURSING/CASE MANAGEMENT/SOCIAL WORK - NSDCPEFALRISK_GEN_ALL_CORE
For information on Fall & Injury Prevention, visit: [email or web address] OR  [email or web address] OR  https://www.cdc.gov/steadi/patient.html

## 2023-01-10 NOTE — DISCHARGE NOTE PROVIDER - NSDCCPCAREPLAN_GEN_ALL_CORE_FT
PRINCIPAL DISCHARGE DIAGNOSIS  Diagnosis: Acute pancreatitis  Assessment and Plan of Treatment: You presented to the hospital with abdominal pain diagnosed as pancreatitis due to high lipid/fat levels. You were also found to have elevated blood sugar levels.       PRINCIPAL DISCHARGE DIAGNOSIS  Diagnosis: Acute pancreatitis  Assessment and Plan of Treatment: You presented to the hospital with abdominal pain diagnosed as pancreatitis due to high lipid/fat levels. You were also found to have elevated blood sugar levels.  Please take your meds as prescribed and follow up with your doctors as suggested

## 2023-01-10 NOTE — DISCHARGE NOTE PROVIDER - ATTENDING DISCHARGE PHYSICAL EXAMINATION:
Vital Signs Last 24 Hrs  T(F): 96.2 (11 Jan 2023 12:17), Max: 96.2 (11 Jan 2023 12:17)  HR: 71 (11 Jan 2023 12:17) (71 - 71)  BP: 111/69 (11 Jan 2023 12:17) (111/69 - 111/69)  RR: 18 (11 Jan 2023 12:17) (18 - 18)  SpO2: --    PHYSICAL EXAM:  GENERAL: NAD, well-groomed, well-developed  HEAD:  Atraumatic, Normocephalic  EYES: EOMI, conjunctiva and sclera clear  ENMT: Moist mucous membranes, Good dentition, no thrush  NECK: Supple, No JVD  CHEST/LUNG: Clear to auscultation bilaterally, good air entry, non-labored breathing  HEART: RRR; S1/S2, No murmur  ABDOMEN: Soft, +tender, Nondistended; Bowel sounds present  VASCULAR: Normal pulses, Normal capillary refill  EXTREMITIES: No calf tenderness, No cyanosis, No edema  LYMPH: Normal; No lymphadenopathy noted  SKIN: Warm, Intact  PSYCH: Normal mood, Normal affect  NERVOUS SYSTEM:  A/O x3, Good concentration; CN 2-12 intact, No focal deficits

## 2023-01-10 NOTE — PROGRESS NOTE ADULT - PROVIDER SPECIALTY LIST ADULT
Gastroenterology
CCU
Critical Care
Hospitalist
Internal Medicine
Internal Medicine
Pulmonology
Pulmonology
CCU
Critical Care
Endocrinology

## 2023-01-10 NOTE — DISCHARGE NOTE PROVIDER - CARE PROVIDER_API CALL
Leda Gibbons  INTERNAL MEDICINE  [address] [place] [address]  Phone: [phone]  Fax: [phone]  Follow Up Time:     Kathryn Pulliam)  Internal Medicine  [address] [place] [address]  Phone: [phone]  Fax: [phone]  Follow Up Time:

## 2023-01-10 NOTE — DISCHARGE NOTE PROVIDER - HOSPITAL COURSE
42-year-old male with history of pancreatitis, social drinker presents to the ED accompanied by sister complaining of bandlike abdominal pain, 8/10 radiation to the back that started yesterday afternoon. Patient states pain got worse today.  Patient attempted to take pantoprazole with no relief so came to the ED. Pt is a social drinker and last drink was on new years hiren.    ED vitals:   / 86, HR 78, RR 18, O2 sat Temp 97.8, on RA satting well    Sig labs:   white count 14k, lipase 125, Na 126    CT abd/ pelvis:   1.  Findings compatible with acute interstitial edematous pancreatitis;   no evidence of necrosis or peripancreatic fluid collections.  2.  Hepatic steatosis and hepatomegaly.    Pt given pain meds and fluids in the ED    During hospitalization:  Pt was treated for Acute Moderately-Severe Interstitial Pancreatitis d/t Hypertriglyceridemia. Pt was hemodynamically stable with no evidence of Sepsis. Pt lipase 124, LFTs WNL. Pt found to be hyperglycemic and elevated serum triglycerides on admission. Pt was given maintenance fluid therapy to avoid volume overload. Endocrinology and Gastroenterology consulted. Per recommendations, pt was started on insulin drip at 0.1-0.3 unit/kg/hr and was weaned off once Triglyceride fell below 500 and started on Lantus. Pt with improvement of lab values and symptoms. Pt developed back rash and lip swelling secondary to starting atorvostatin. Atorvostatin was discontinued. Pt went  from NPO to clear liquid diet to regular diet.   42-year-old male with history of pancreatitis, social drinker presents to the ED accompanied by sister complaining of bandlike abdominal pain, 8/10 radiation to the back that started yesterday afternoon. Patient states pain got worse today.  Patient attempted to take pantoprazole with no relief so came to the ED. Pt is a social drinker and last drink was on new years hiren.    ED vitals:   / 86, HR 78, RR 18, O2 sat Temp 97.8, on RA satting well    Sig labs:   white count 14k, lipase 125, Na 126    CT abd/ pelvis:   1.  Findings compatible with acute interstitial edematous pancreatitis;   no evidence of necrosis or peripancreatic fluid collections.  2.  Hepatic steatosis and hepatomegaly.    Pt given pain meds and fluids in the ED    During hospitalization:  Pt was treated for Acute Moderately-Severe Interstitial Pancreatitis d/t Hypertriglyceridemia. Pt was hemodynamically stable with no evidence of Sepsis. Pt lipase 124, LFTs WNL. Pt found to be hyperglycemic and elevated serum triglycerides on admission. Pt was given maintenance fluid therapy to avoid volume overload. Endocrinology and Gastroenterology consulted. Per recommendations, pt was started on insulin drip at 0.1-0.3 unit/kg/hr and was weaned off once Triglyceride fell below 500 and started on Lantus. Pt with improvement of lab values and symptoms. Pt developed back rash and lip swelling secondary to starting atorvostatin. Atorvostatin was discontinued. Pt went  from NPO to clear liquid diet to regular diet.      # Acute pancreatitis  # Hypertriglyceridemia  - on admission WBC-->7k  - lipase 125, TG 2400, LDL unable to calculate, total 469  - currently HDS and afebrile  - tolerating PO intake and have soft BMs  - off insulin ggt and now on basal/bolus regimen  - fenofibrate started at 145mg daily Po  - trialed atorvastatin but had allergic reaction with oral mucous swelling and diffuse rash  - will be dced on fenofibrate and will be advised to fu with Endocrinology OP    # Hyponatremia, RESOLVED  - 126 on admission; corrected to 129 for hyperglycemia  - suspect hypotonic hypovolemia  - s/p IVF gradual improvement-->131-->133-->138; stable and WNL    # new DM  - a1c 10.5  - insulin glargine Injectable (LANTUS) 30 Unit(s) SubCutaneous at bedtime  - insulin lispro (ADMELOG) corrective regimen sliding scale   SubCutaneous three times a day before meals  - insulin lispro Injectable (ADMELOG) 12 Unit(s) SubCutaneous three times a day before meals  - will need outpatient follow up with Endocrine and to be discharged with glucometer and test strips and insulin supplies    # Hepatic steatosis  - counseled on lifestyle modications  - will need statin on discharge as above    # Tobacco abuse  - nicotine -  14 mG/24Hr(s) Patch 1 Patch Transdermal daily   42-year-old male with history of pancreatitis, social drinker presents to the ED accompanied by sister complaining of bandlike abdominal pain, 8/10 radiation to the back that started yesterday afternoon. Patient states pain got worse today.  Patient attempted to take pantoprazole with no relief so came to the ED. Pt is a social drinker and last drink was on new years hiren.    ED vitals:   / 86, HR 78, RR 18, O2 sat Temp 97.8, on RA satting well    Sig labs:   white count 14k, lipase 125, Na 126    CT abd/ pelvis:   1.  Findings compatible with acute interstitial edematous pancreatitis;   no evidence of necrosis or peripancreatic fluid collections.  2.  Hepatic steatosis and hepatomegaly.    Pt given pain meds and fluids in the ED    During hospitalization:  Pt was treated for Acute Moderately-Severe Interstitial Pancreatitis d/t Hypertriglyceridemia. Pt was hemodynamically stable with no evidence of Sepsis. Pt lipase 124, LFTs WNL. Pt found to be hyperglycemic and elevated serum triglycerides on admission. Pt was given maintenance fluid therapy to avoid volume overload. Endocrinology and Gastroenterology consulted. Per recommendations, pt was started on insulin drip at 0.1-0.3 unit/kg/hr and was weaned off once Triglyceride fell below 500 and started on Lantus. Pt with improvement of lab values and symptoms. Pt developed back rash and lip swelling secondary to starting atorvostatin. Atorvostatin was discontinued. Pt went  from NPO to clear liquid diet to regular diet.      # Acute pancreatitis  # Hypertriglyceridemia  - on admission WBC-->7k  - lipase 125, TG 2400, LDL unable to calculate, total 469  - currently HDS and afebrile  - tolerating PO intake and have soft BMs  - off insulin ggt and now on basal/bolus regimen  - fenofibrate started at 145mg daily Po  - trialed atorvastatin but had allergic reaction with oral mucous swelling and diffuse rash  - will be dced on fenofibrate and zetia - advised to fu with Endocrinology OP    # Hyponatremia, RESOLVED  - 126 on admission; corrected to 129 for hyperglycemia  - suspect hypotonic hypovolemia  - s/p IVF gradual improvement-->131-->133-->138; stable and WNL    # new DM  - a1c 10.5  - insulin glargine Injectable (LANTUS) 30 Unit(s) SubCutaneous at bedtime  - insulin lispro (ADMELOG) corrective regimen sliding scale   SubCutaneous three times a day before meals  - insulin lispro Injectable (ADMELOG) 12 Unit(s) SubCutaneous three times a day before meals  - will need outpatient follow up with Endocrine and to be discharged with glucometer and test strips and insulin supplies    # Hepatic steatosis  - counseled on lifestyle modications  - will need statin on discharge as above    # Tobacco abuse  - nicotine -  14 mG/24Hr(s) Patch 1 Patch Transdermal daily  Patient discharged with home care.

## 2023-01-10 NOTE — PROGRESS NOTE ADULT - SUBJECTIVE AND OBJECTIVE BOX
JOSE TAM  42y  Male      Patient is a 42y old  Male who presents with a chief complaint of Acute Pancreatitis (09 Jan 2023 13:42)      INTERVAL HPI/OVERNIGHT EVENTS: no acute events overnight. downgraded from CCU. no abdominal pain. ambulating well. tolerating PO intake. voiding spontaneously and BM +. Sister at bedside(translates)      REVIEW OF SYSTEMS:  CONSTITUTIONAL: No fever, weight loss, or fatigue  RESPIRATORY: No cough, wheezing, chills or hemoptysis; No shortness of breath  CARDIOVASCULAR: No chest pain, palpitations, dizziness, or leg swelling  GASTROINTESTINAL: No abdominal or epigastric pain. No nausea, vomiting, or hematemesis; No diarrhea or constipation. No melena or hematochezia.  GENITOURINARY: No dysuria, frequency, hematuria, or incontinence  NEUROLOGICAL: No headaches, memory loss, loss of strength, numbness, or tremors  SKIN: No itching, burning, rashes, or lesions   MUSCULOSKELETAL: No joint pain or swelling; No muscle, back, or extremity pain  PSYCHIATRIC: No depression, anxiety, mood swings, or difficulty sleeping  All other review of systems negative    VITALS  T(C): 35.6 (01-10-23 @ 12:08), Max: 36.7 (01-09-23 @ 22:00)  HR: 75 (01-10-23 @ 12:08) (73 - 80)  BP: 117/81 (01-10-23 @ 12:08) (117/81 - 140/88)  RR: 18 (01-10-23 @ 12:08) (18 - 18)  SpO2: 96% (01-09-23 @ 20:15) (96% - 96%)  Wt(kg): --Vital Signs Last 24 Hrs  T(C): 35.6 (10 Nnamid 2023 12:08), Max: 36.7 (09 Jan 2023 22:00)  T(F): 96 (10 Nnamdi 2023 12:08), Max: 98.1 (09 Jan 2023 22:00)  HR: 75 (10 Nnamdi 2023 12:08) (73 - 80)  BP: 117/81 (10 Nnamdi 2023 12:08) (117/81 - 140/88)  BP(mean): 94 (09 Jan 2023 22:00) (94 - 108)  RR: 18 (10 Nnamdi 2023 12:08) (18 - 18)  SpO2: 96% (09 Jan 2023 20:15) (96% - 96%)    Parameters below as of 09 Jan 2023 20:15  Patient On (Oxygen Delivery Method): room air        PHYSICAL EXAM:  GENERAL: middle-age M, NAD, non toxic appearing  EYES: anicteric sclera, non injected conjunctiva, EOMI  PSYCH: no agitation, baseline mentation  NERVOUS SYSTEM:  Alert & Oriented X3, CN 2-12 grossly intact  PULMONARY: symmetrical chest rise, no accessory muscle use  CARDIOVASCULAR: Regular rate and rhythm; No murmurs, rubs, or gallops  GI: non distended, non ttp  EXTREMITIES:  2+ Peripheral Pulses, No clubbing, cyanosis, or edema  SKIN: No rashes or lesions    Consultant(s) Notes Reviewed:  [x ] YES  [ ] NO    Discussed with Consultants/Other Providers [ x] YES     LABS                          13.9   7.79  )-----------( 216      ( 10 Nnamdi 2023 07:50 )             42.1     01-10    141  |  104  |  11  ----------------------------<  114<H>  4.3   |  28  |  0.9    Ca    9.2      10 Nnamdi 2023 07:50  Mg     2.1     01-10    TPro  6.9  /  Alb  4.0  /  TBili  0.5  /  DBili  x   /  AST  68<H>  /  ALT  60<H>  /  AlkPhos  93  01-10    Lactate Trend  01-08 @ 04:33 Lactate:0.6   01-07 @ 04:48 Lactate:0.7   01-06 @ 19:48 Lactate:0.8     POCT Blood Glucose.: 175 mg/dL (10 Nnamdi 2023 16:17)    RADIOLOGY & ADDITIONAL TESTS:  - no images 1/10  Imaging Personally Reviewed:  [ ] YES  [ ] NO    HEALTH ISSUES - PROBLEM Dx:      MEDICATIONS  (STANDING):  chlorhexidine 2% Cloths 1 Application(s) Topical daily  dextrose 50% Injectable 50 milliLiter(s) IV Push every 15 minutes  enoxaparin Injectable 40 milliGRAM(s) SubCutaneous every 24 hours  fenofibrate Tablet 145 milliGRAM(s) Oral daily  insulin glargine Injectable (LANTUS) 30 Unit(s) SubCutaneous at bedtime  insulin lispro (ADMELOG) corrective regimen sliding scale   SubCutaneous three times a day before meals  insulin lispro Injectable (ADMELOG) 12 Unit(s) SubCutaneous three times a day before meals  nicotine -  14 mG/24Hr(s) Patch 1 Patch Transdermal daily  pantoprazole    Tablet 40 milliGRAM(s) Oral before breakfast    MEDICATIONS  (PRN):  acetaminophen     Tablet .. 650 milliGRAM(s) Oral every 6 hours PRN Temp greater or equal to 38C (100.4F), Mild Pain (1 - 3)  diphenhydrAMINE Injectable 25 milliGRAM(s) IV Push every 6 hours PRN facial redness and swelling  morphine  - Injectable 2 milliGRAM(s) IV Push every 6 hours PRN Severe Pain (7 - 10)  ondansetron Injectable 4 milliGRAM(s) IV Push every 8 hours PRN Nausea and/or Vomiting

## 2023-01-10 NOTE — PROGRESS NOTE ADULT - ASSESSMENT
40 y/o M with PMHx of pancreatitis, who presents for acute onset progressive worsening of periumblical pain that radiated to back in the setting of elevated lipase, and CT abd/pelvis findings suggestive of acute interstitial pancreatitis. Admitted to the CCU on insulin ggt and downgraded when TG <500. Currently HDS and afebrile.    # Acute pancreatitis  # Hypertriglyceridemia  - on admission WBC-->7k  - lipase 125, TG 2400, LDL unable to calculate, total 469  - currently HDS and afebrile  - tolerating PO intake and have soft BMs  - off insulin ggt and now on basal/bolus regimen  - fenofibrate started at 145mg daily Po  - trialed atorvastatin but had allergic reaction with oral mucous swelling and diffuse rash  - consulted Allergy to assess  - will need form of cholesterol lowering medication as ASCVD risk is extremely elevated reflecting increase risk of stroke and CV event    # Hyponatremia, RESOLVED  - 126 on admission; corrected to 129 for hyperglycemia  - suspect hypotonic hypovolemia  - s/p IVF gradual improvement-->131-->133-->138; stable and WNL    # new DM  - a1c 10.5  - insulin glargine Injectable (LANTUS) 30 Unit(s) SubCutaneous at bedtime  - insulin lispro (ADMELOG) corrective regimen sliding scale   SubCutaneous three times a day before meals  - insulin lispro Injectable (ADMELOG) 12 Unit(s) SubCutaneous three times a day before meals  - will need outpatient follow up with Endocrine and to be discharged with glucometer and test strips and insulin supplies    # Hepatic steatosis  - counseled on lifestyle modications  - will need statin on discharge as above    # Tobacco abuse  - nicotine -  14 mG/24Hr(s) Patch 1 Patch Transdermal daily    VTE ppx: enoxaparin Injectable 40 milliGRAM(s) SubCutaneous every 24 hours  GI ppx: pantoprazole    Tablet 40 milliGRAM(s) Oral before breakfast      #Progress Note Handoff  Pending (specify):  allergy consult. statin reinitation?  Family discussion: updated  Disposition: home in next 24hrs tentatively; will need outpatinet endocrine, GI. will need glucometer/test strips and insulin supples

## 2023-01-11 VITALS
HEART RATE: 71 BPM | DIASTOLIC BLOOD PRESSURE: 69 MMHG | RESPIRATION RATE: 18 BRPM | SYSTOLIC BLOOD PRESSURE: 111 MMHG | TEMPERATURE: 96 F

## 2023-01-11 LAB
ALBUMIN SERPL ELPH-MCNC: 3.8 G/DL — SIGNIFICANT CHANGE UP (ref 3.5–5.2)
ALP SERPL-CCNC: 103 U/L — SIGNIFICANT CHANGE UP (ref 30–115)
ALP SERPL-CCNC: 93 U/L — SIGNIFICANT CHANGE UP (ref 30–115)
ALT FLD-CCNC: 63 U/L — HIGH (ref 0–41)
ANION GAP SERPL CALC-SCNC: 11 MMOL/L — SIGNIFICANT CHANGE UP (ref 7–14)
AST SERPL-CCNC: 64 U/L — HIGH (ref 0–41)
BILIRUB SERPL-MCNC: 0.4 MG/DL — SIGNIFICANT CHANGE UP (ref 0.2–1.2)
BUN SERPL-MCNC: 11 MG/DL — SIGNIFICANT CHANGE UP (ref 10–20)
CALCIUM SERPL-MCNC: 9.2 MG/DL — SIGNIFICANT CHANGE UP (ref 8.4–10.5)
CHLORIDE SERPL-SCNC: 102 MMOL/L — SIGNIFICANT CHANGE UP (ref 98–110)
CO2 SERPL-SCNC: 25 MMOL/L — SIGNIFICANT CHANGE UP (ref 17–32)
CREAT SERPL-MCNC: 0.9 MG/DL — SIGNIFICANT CHANGE UP (ref 0.7–1.5)
EGFR: 109 ML/MIN/1.73M2 — SIGNIFICANT CHANGE UP
GLUCOSE BLDC GLUCOMTR-MCNC: 117 MG/DL — HIGH (ref 70–99)
GLUCOSE BLDC GLUCOMTR-MCNC: 127 MG/DL — HIGH (ref 70–99)
GLUCOSE BLDC GLUCOMTR-MCNC: 207 MG/DL — HIGH (ref 70–99)
GLUCOSE SERPL-MCNC: 171 MG/DL — HIGH (ref 70–99)
HCT VFR BLD CALC: 39 % — LOW (ref 42–52)
HGB BLD-MCNC: 13.2 G/DL — LOW (ref 14–18)
MAGNESIUM SERPL-MCNC: 1.9 MG/DL — SIGNIFICANT CHANGE UP (ref 1.8–2.4)
MCHC RBC-ENTMCNC: 30 PG — SIGNIFICANT CHANGE UP (ref 27–31)
MCHC RBC-ENTMCNC: 33.8 G/DL — SIGNIFICANT CHANGE UP (ref 32–37)
MCV RBC AUTO: 88.6 FL — SIGNIFICANT CHANGE UP (ref 80–94)
NRBC # BLD: 0 /100 WBCS — SIGNIFICANT CHANGE UP (ref 0–0)
PLATELET # BLD AUTO: 239 K/UL — SIGNIFICANT CHANGE UP (ref 130–400)
POTASSIUM SERPL-MCNC: 3.7 MMOL/L — SIGNIFICANT CHANGE UP (ref 3.5–5)
POTASSIUM SERPL-SCNC: 3.7 MMOL/L — SIGNIFICANT CHANGE UP (ref 3.5–5)
PROT SERPL-MCNC: 6.4 G/DL — SIGNIFICANT CHANGE UP (ref 6–8)
RBC # BLD: 4.4 M/UL — LOW (ref 4.7–6.1)
RBC # FLD: 12.1 % — SIGNIFICANT CHANGE UP (ref 11.5–14.5)
SODIUM SERPL-SCNC: 138 MMOL/L — SIGNIFICANT CHANGE UP (ref 135–146)
TRIGL SERPL-MCNC: 516 MG/DL — HIGH
WBC # BLD: 7.57 K/UL — SIGNIFICANT CHANGE UP (ref 4.8–10.8)
WBC # FLD AUTO: 7.57 K/UL — SIGNIFICANT CHANGE UP (ref 4.8–10.8)

## 2023-01-11 PROCEDURE — 99239 HOSP IP/OBS DSCHRG MGMT >30: CPT

## 2023-01-11 RX ORDER — ISOPROPYL ALCOHOL, BENZOCAINE .7; .06 ML/ML; ML/ML
1 SWAB TOPICAL
Qty: 100 | Refills: 1
Start: 2023-01-11 | End: 2023-03-01

## 2023-01-11 RX ORDER — FENOFIBRATE,MICRONIZED 130 MG
1 CAPSULE ORAL
Qty: 60 | Refills: 0
Start: 2023-01-11 | End: 2023-03-11

## 2023-01-11 RX ORDER — PANTOPRAZOLE SODIUM 20 MG/1
1 TABLET, DELAYED RELEASE ORAL
Qty: 60 | Refills: 0
Start: 2023-01-11 | End: 2023-03-11

## 2023-01-11 RX ORDER — PIOGLITAZONE HYDROCHLORIDE 15 MG/1
1 TABLET ORAL
Qty: 60 | Refills: 2
Start: 2023-01-11 | End: 2023-07-09

## 2023-01-11 RX ORDER — INSULIN ASPART 100 [IU]/ML
10 INJECTION, SOLUTION SUBCUTANEOUS
Qty: 1 | Refills: 0
Start: 2023-01-11 | End: 2023-03-11

## 2023-01-11 RX ORDER — METFORMIN HYDROCHLORIDE 850 MG/1
1 TABLET ORAL
Qty: 60 | Refills: 2
Start: 2023-01-11 | End: 2023-04-10

## 2023-01-11 RX ORDER — INSULIN GLARGINE 100 [IU]/ML
30 INJECTION, SOLUTION SUBCUTANEOUS
Qty: 1 | Refills: 0
Start: 2023-01-11 | End: 2023-03-11

## 2023-01-11 RX ORDER — INSULIN LISPRO 100/ML
10 VIAL (ML) SUBCUTANEOUS
Qty: 1 | Refills: 0
Start: 2023-01-11 | End: 2023-03-11

## 2023-01-11 RX ORDER — EZETIMIBE 10 MG/1
1 TABLET ORAL
Qty: 60 | Refills: 0
Start: 2023-01-11

## 2023-01-11 RX ORDER — OMEGA-3 ACID ETHYL ESTERS 1 G
6.25 CAPSULE ORAL
Qty: 375 | Refills: 0
Start: 2023-01-11 | End: 2023-03-11

## 2023-01-11 RX ORDER — GLUCAGON INJECTION, SOLUTION 0.5 MG/.1ML
1 INJECTION, SOLUTION SUBCUTANEOUS
Qty: 1 | Refills: 0
Start: 2023-01-11 | End: 2023-02-09

## 2023-01-11 RX ADMIN — Medication 145 MILLIGRAM(S): at 12:19

## 2023-01-11 RX ADMIN — Medication 1 PATCH: at 12:42

## 2023-01-11 RX ADMIN — Medication 1 PATCH: at 12:19

## 2023-01-11 RX ADMIN — Medication 12 UNIT(S): at 12:17

## 2023-01-11 RX ADMIN — ENOXAPARIN SODIUM 40 MILLIGRAM(S): 100 INJECTION SUBCUTANEOUS at 12:21

## 2023-01-11 RX ADMIN — Medication 12 UNIT(S): at 17:02

## 2023-01-11 RX ADMIN — Medication 4: at 12:16

## 2023-01-11 RX ADMIN — PANTOPRAZOLE SODIUM 40 MILLIGRAM(S): 20 TABLET, DELAYED RELEASE ORAL at 05:26

## 2023-01-11 RX ADMIN — CHLORHEXIDINE GLUCONATE 1 APPLICATION(S): 213 SOLUTION TOPICAL at 12:20

## 2023-01-11 RX ADMIN — Medication 12 UNIT(S): at 08:12

## 2023-01-11 NOTE — CONSULT NOTE ADULT - ASSESSMENT
42-year-old male with history of pancreatitis, social drinker admitted for hypertriglyceridemia induced pancreatitis.    #Hypertriglyceridemia induced pancreatitis:  -Triglycerides, Serum: 2411 mg/dL--> 1084-->693-->629-->517-->463.  - pt developed Angioedema on atorvastatin.   - pt was on insulin drip in the CCU.   - currently on Lantus 30 U/ 12/12/12+SS  - pt is on fenofibrate 145mg qday.  -    42-year-old male with history of pancreatitis, social drinker admitted for hypertriglyceridemia induced pancreatitis.    #Hypertriglyceridemia induced pancreatitis:  -Triglycerides, Serum: 2411 mg/dL--> 1084-->693-->629-->517-->463.  - F/u for pt with combined hyperlipidemia and type 2 DM; had angioedema with statin therapy.  1-add fish oil 2 gms bid (if able should be specifically on VASCEPA)  2-add zetia 10 mg qd; will need PCSK9-I as outpatient  3-should go home on basal/bous insulin therapy; as outpatient would benefit from GLP-1 therapy.  4-also treat DM with metformin 850 mg bid and pioglitazone 30 mg qd

## 2023-01-11 NOTE — CONSULT NOTE ADULT - SUBJECTIVE AND OBJECTIVE BOX
Request for consultation:  Requested by:    Patient is a 42y old  Male who presents with a chief complaint of Acute Pancreatitis (09 Jan 2023 13:42)    HPI:  42-year-old male with history of pancreatitis, social drinker presents to the ED accompanied by sister complaining of bandlike abdominal pain, 8/10 radiation to the back that started yesterday afternoon. Patient states pain got worse today.  Patient attempted to take pantoprazole with no relief so came to the ED.    Pt is a social drinker and last drink was on new years hiren.    ED vitals:   / 86, HR 78, RR 18, O2 sat Temp 97.8, on RA satting well    Sig labs:   white count 14k, lipase 125, Na 126    CT abd/ pelvis:   1.  Findings compatible with acute interstitial edematous pancreatitis;   no evidence of necrosis or peripancreatic fluid collections.  2.  Hepatic steatosis and hepatomegaly.    Pt given pain meds and fluids in the ED (05 Jan 2023 00:31)      FAMILY HISTORY:    PAST MEDICAL & SURGICAL HISTORY:    Birth History:  Developmental History:    Review of Systems:  All review of systems negative, except for those marked:  General:		[] Abnormal:  Pulmonary:		[] Abnormal:  Cardiac:		[] Abnormal:  Gastrointestinal:	[] Abnormal:  ENT:			[] Abnormal:  Renal/Urologic:		[] Abnormal:  Musculoskeletal:	[] Abnormal:  Endocrine:		[] Abnormal:  Hematologic:		[] Abnormal:  Neurologic:		[] Abnormal:  Skin:			[] Abnormal:  Allergy/Immune:	[] Abnormal:  Psychiatric:		[] Abnormal:    Allergies    atorvastatin (Other; Rash)    Intolerances      MEDICATIONS  (STANDING):  chlorhexidine 2% Cloths 1 Application(s) Topical daily  dextrose 50% Injectable 50 milliLiter(s) IV Push every 15 minutes  enoxaparin Injectable 40 milliGRAM(s) SubCutaneous every 24 hours  fenofibrate Tablet 145 milliGRAM(s) Oral daily  insulin glargine Injectable (LANTUS) 30 Unit(s) SubCutaneous at bedtime  insulin lispro (ADMELOG) corrective regimen sliding scale   SubCutaneous three times a day before meals  insulin lispro Injectable (ADMELOG) 12 Unit(s) SubCutaneous three times a day before meals  nicotine -  14 mG/24Hr(s) Patch 1 Patch Transdermal daily  pantoprazole    Tablet 40 milliGRAM(s) Oral before breakfast    MEDICATIONS  (PRN):  acetaminophen     Tablet .. 650 milliGRAM(s) Oral every 6 hours PRN Temp greater or equal to 38C (100.4F), Mild Pain (1 - 3)  diphenhydrAMINE Injectable 25 milliGRAM(s) IV Push every 6 hours PRN facial redness and swelling  morphine  - Injectable 2 milliGRAM(s) IV Push every 6 hours PRN Severe Pain (7 - 10)  ondansetron Injectable 4 milliGRAM(s) IV Push every 8 hours PRN Nausea and/or Vomiting      Vital Signs Last 24 Hrs  T(C): 35.6 (10 Nnamdi 2023 12:08), Max: 35.6 (10 Nnamdi 2023 12:08)  T(F): 96 (10 Nnamdi 2023 12:08), Max: 96 (10 Nnamdi 2023 12:08)  HR: 75 (10 Nnamdi 2023 12:08) (75 - 75)  BP: 117/81 (10 Nnamdi 2023 12:08) (117/81 - 117/81)  BP(mean): --  RR: 18 (10 Nnamdi 2023 12:08) (18 - 18)  SpO2: --          PHYSICAL EXAM  All physical exam findings normal, except those marked:  General:	Alert, active, cooperative, NAD, well hydrated  Neck		Normal: supple, no cervical adenopathy, no palpable thyroid  Cardiovascular	Normal: regular rate, normal S1, S2, no murmurs  Respiratory	Normal: no chest wall deformity, normal respiratory pattern, CTA B/L  Abdominal	Normal: soft, ND, NT, bowel sounds present, no masses, no organomegaly  		Normal normal genitalia, testes descended, circumcised/uncircumcised  .		Hiram stage:			Breast hiram:  .		Menstrual history:  Extremities	Normal: FROM x4  Skin		Normal: intact and not indurated, no rash, no acanthosis nigricans  Neurologic	Normal: grossly intact    LABS                          13.2   7.57  )-----------( 239      ( 11 Jan 2023 01:58 )             39.0     01-11    138  |  102  |  11  ----------------------------<  171<H>  3.7   |  25  |  0.9    Ca    9.2      11 Jan 2023 01:58  Mg     1.9     01-11    TPro  6.4  /  Alb  3.8  /  TBili  0.4  /  DBili  x   /  AST  64<H>  /  ALT  63<H>  /  AlkPhos  93  01-11        CAPILLARY BLOOD GLUCOSE      POCT Blood Glucose.: 207 mg/dL (11 Jan 2023 11:04)  POCT Blood Glucose.: 127 mg/dL (11 Jan 2023 07:21)  POCT Blood Glucose.: 99 mg/dL (10 Nnamdi 2023 21:55)  POCT Blood Glucose.: 175 mg/dL (10 Nnamdi 2023 16:17)

## 2023-01-16 DIAGNOSIS — E11.65 TYPE 2 DIABETES MELLITUS WITH HYPERGLYCEMIA: ICD-10-CM

## 2023-01-16 DIAGNOSIS — T78.3XXA ANGIONEUROTIC EDEMA, INITIAL ENCOUNTER: ICD-10-CM

## 2023-01-16 DIAGNOSIS — E87.1 HYPO-OSMOLALITY AND HYPONATREMIA: ICD-10-CM

## 2023-01-16 DIAGNOSIS — Z72.0 TOBACCO USE: ICD-10-CM

## 2023-01-16 DIAGNOSIS — K85.80 OTHER ACUTE PANCREATITIS WITHOUT NECROSIS OR INFECTION: ICD-10-CM

## 2023-01-16 DIAGNOSIS — E78.5 HYPERLIPIDEMIA, UNSPECIFIED: ICD-10-CM

## 2023-01-16 DIAGNOSIS — Z88.8 ALLERGY STATUS TO OTHER DRUGS, MEDICAMENTS AND BIOLOGICAL SUBSTANCES STATUS: ICD-10-CM

## 2023-01-16 DIAGNOSIS — E78.1 PURE HYPERGLYCERIDEMIA: ICD-10-CM

## 2023-01-16 DIAGNOSIS — E66.9 OBESITY, UNSPECIFIED: ICD-10-CM

## 2023-01-16 DIAGNOSIS — K76.0 FATTY (CHANGE OF) LIVER, NOT ELSEWHERE CLASSIFIED: ICD-10-CM

## 2023-01-16 DIAGNOSIS — R65.10 SYSTEMIC INFLAMMATORY RESPONSE SYNDROME (SIRS) OF NON-INFECTIOUS ORIGIN WITHOUT ACUTE ORGAN DYSFUNCTION: ICD-10-CM

## 2023-01-27 ENCOUNTER — OUTPATIENT (OUTPATIENT)
Dept: OUTPATIENT SERVICES | Facility: HOSPITAL | Age: 43
LOS: 1 days | Discharge: HOME | End: 2023-01-27
Payer: MEDICAID

## 2023-01-27 DIAGNOSIS — M54.2 CERVICALGIA: ICD-10-CM

## 2023-01-27 DIAGNOSIS — M54.59 OTHER LOW BACK PAIN: ICD-10-CM

## 2023-01-27 PROCEDURE — 72072 X-RAY EXAM THORAC SPINE 3VWS: CPT | Mod: 26

## 2023-01-27 PROCEDURE — 72050 X-RAY EXAM NECK SPINE 4/5VWS: CPT | Mod: 26

## 2023-04-20 ENCOUNTER — OUTPATIENT (OUTPATIENT)
Dept: OUTPATIENT SERVICES | Facility: HOSPITAL | Age: 43
LOS: 1 days | End: 2023-04-20
Payer: MEDICAID

## 2023-04-20 DIAGNOSIS — M79.605 PAIN IN LEFT LEG: ICD-10-CM

## 2023-04-20 DIAGNOSIS — M79.604 PAIN IN RIGHT LEG: ICD-10-CM

## 2023-04-20 PROCEDURE — 73610 X-RAY EXAM OF ANKLE: CPT | Mod: 50

## 2023-04-20 PROCEDURE — 73590 X-RAY EXAM OF LOWER LEG: CPT | Mod: 26,50

## 2023-04-20 PROCEDURE — 73610 X-RAY EXAM OF ANKLE: CPT | Mod: 26,50

## 2023-04-20 PROCEDURE — 73564 X-RAY EXAM KNEE 4 OR MORE: CPT | Mod: 50

## 2023-04-20 PROCEDURE — 73564 X-RAY EXAM KNEE 4 OR MORE: CPT | Mod: 26,50

## 2023-04-20 PROCEDURE — 73590 X-RAY EXAM OF LOWER LEG: CPT | Mod: 50

## 2023-04-21 DIAGNOSIS — M79.605 PAIN IN LEFT LEG: ICD-10-CM

## 2023-04-21 DIAGNOSIS — M79.604 PAIN IN RIGHT LEG: ICD-10-CM

## 2023-06-13 NOTE — PATIENT PROFILE ADULT - IS THERE A SUSPICION OF ABUSE/NEGLIGENCE?
atenolol      Last Written Prescription Date:  3/21/2023  Last Fill Quantity: 90,   # refills: 0  Last Office Visit: 1/23/2023  Future Office visit:    Next 5 appointments (look out 90 days)    Jun 28, 2023  8:45 AM  (Arrive by 8:30 AM)  Office Visit with Alex Walker MD  [facility] [place] ([facility] [place] ) [address]  [place] [address]  [phone]           Routing refill request to provider for review/approval because:    simvastatin      Last Written Prescription Date:  3/21/2023  Last Fill Quantity: 90,   # refills: 0  Last Office Visit: 1/23/2023  Future Office visit:    Next 5 appointments (look out 90 days)    Jun 28, 2023  8:45 AM  (Arrive by 8:30 AM)  Office Visit with Alex Walker MD  [facility] [place] ([facility] [place] ) [address]  [place] [address]  [phone]           Routing refill request to provider for review/approval because:        
no

## 2023-08-03 ENCOUNTER — TRANSCRIPTION ENCOUNTER (OUTPATIENT)
Age: 43
End: 2023-08-03

## 2023-08-03 ENCOUNTER — APPOINTMENT (OUTPATIENT)
Dept: ENDOCRINOLOGY | Facility: CLINIC | Age: 43
End: 2023-08-03

## 2023-09-29 NOTE — H&P ADULT - NSHPPHYSICALEXAM_GEN_ALL_CORE
Date: 9/29/2023    Time of Call: 4:20 PM     Diagnosis:  HF     [ VORB ] Ordering provider: Justina RAHMAN  Order: STOP Lisinopril. Wait 2 days. Start entrestot 12/13 mg BID. BMP 1 week.      Order received by: Usha Martino RN      Follow-up/additional notes: mychart misha Milan. Lab order faxed to [facility].         GENERAL: NAD, lying in bed comfortably  HEAD:  Atraumatic, Normocephalic  EYES: EOMI, PERRLA, slight scleral icterus noted  ENT: Moist mucous membranes  NECK: Supple, No JVD  CHEST/LUNG: Clear to auscultation bilaterally; No rales, rhonchi, wheezing, or rubs. Unlabored respirations  HEART: Regular rate and rhythm; No murmurs, rubs, or gallops  ABDOMEN: BSx4; tender on the epigastric area  EXTREMITIES:  2+ Peripheral Pulses, brisk capillary refill. No clubbing, cyanosis, or edema  NERVOUS SYSTEM:  A&Ox3, no focal deficits

## 2024-04-11 PROBLEM — Z00.00 ENCOUNTER FOR PREVENTIVE HEALTH EXAMINATION: Status: ACTIVE | Noted: 2024-04-11

## 2024-06-03 ENCOUNTER — APPOINTMENT (OUTPATIENT)
Dept: CARDIOLOGY | Facility: CLINIC | Age: 44
End: 2024-06-03

## 2024-10-02 ENCOUNTER — APPOINTMENT (OUTPATIENT)
Dept: ORTHOPEDIC SURGERY | Facility: CLINIC | Age: 44
End: 2024-10-02
Payer: COMMERCIAL

## 2024-10-02 DIAGNOSIS — S67.191A CRUSHING INJURY OF LEFT INDEX FINGER, INITIAL ENCOUNTER: ICD-10-CM

## 2024-10-02 DIAGNOSIS — S69.92XA UNSPECIFIED INJURY OF LEFT WRIST, HAND AND FINGER(S), INITIAL ENCOUNTER: ICD-10-CM

## 2024-10-02 PROCEDURE — 73140 X-RAY EXAM OF FINGER(S): CPT | Mod: LT

## 2024-10-02 PROCEDURE — 99204 OFFICE O/P NEW MOD 45 MIN: CPT

## 2024-10-02 NOTE — ASSESSMENT
[FreeTextEntry1] : The patient is here today for a lt index finger injury. About 1 and a half months ago he hit his left index finger with a hammer. Stitches were put in to close a cut on the knuckle that have since been removed.  The injury occurred in [place].  left index finger: swelling over PIP joint, tenderness to palpation over scar over the dorsal aspect of the PIP joint which is hypertrophic, decreased range of motion of the finger with being most limited at the DIP joint, neurovascular intact  x-ray left index finger 2 views shows well aligned fracture of the middle phalanx  We discussed that his fracture is healing well and that the scar that is present can be very painful. I recommend scar massages with vitamin E or cocoa butter to work on the scar tissue. We discussed that the joints are well aligned and that therapy would help his finger heal. We discussed using ice or nsaid's to reduce inflammation at the site. I recommend working on regaining motion in his hand e.g. squeezing a sponge. We discussed that there may be dissolvable sutures left that he is feeling, although I did not appreciate much upon exam. I recommend he go to therapy and we discussed he may stop when he feels he has healed.  He will most likely follow-up in about 4 to 6 weeks with Radha or Norberto.

## 2024-11-04 ENCOUNTER — APPOINTMENT (OUTPATIENT)
Dept: CARDIOLOGY | Facility: CLINIC | Age: 44
End: 2024-11-04
Payer: COMMERCIAL

## 2024-11-04 ENCOUNTER — NON-APPOINTMENT (OUTPATIENT)
Age: 44
End: 2024-11-04

## 2024-11-04 VITALS
HEIGHT: 68 IN | HEART RATE: 74 BPM | SYSTOLIC BLOOD PRESSURE: 102 MMHG | BODY MASS INDEX: 33.49 KG/M2 | DIASTOLIC BLOOD PRESSURE: 70 MMHG | WEIGHT: 221 LBS

## 2024-11-04 DIAGNOSIS — Z82.49 FAMILY HISTORY OF ISCHEMIC HEART DISEASE AND OTHER DISEASES OF THE CIRCULATORY SYSTEM: ICD-10-CM

## 2024-11-04 DIAGNOSIS — F17.200 NICOTINE DEPENDENCE, UNSPECIFIED, UNCOMPLICATED: ICD-10-CM

## 2024-11-04 DIAGNOSIS — R07.9 CHEST PAIN, UNSPECIFIED: ICD-10-CM

## 2024-11-04 DIAGNOSIS — E11.9 TYPE 2 DIABETES MELLITUS W/OUT COMPLICATIONS: ICD-10-CM

## 2024-11-04 DIAGNOSIS — E78.5 HYPERLIPIDEMIA, UNSPECIFIED: ICD-10-CM

## 2024-11-04 DIAGNOSIS — Z86.79 PERSONAL HISTORY OF OTHER DISEASES OF THE CIRCULATORY SYSTEM: ICD-10-CM

## 2024-11-04 DIAGNOSIS — R06.02 SHORTNESS OF BREATH: ICD-10-CM

## 2024-11-04 DIAGNOSIS — Z78.9 OTHER SPECIFIED HEALTH STATUS: ICD-10-CM

## 2024-11-04 DIAGNOSIS — Z83.438 FAMILY HISTORY OF OTHER DISORDER OF LIPOPROTEIN METABOLISM AND OTHER LIPIDEMIA: ICD-10-CM

## 2024-11-04 DIAGNOSIS — Z82.3 FAMILY HISTORY OF STROKE: ICD-10-CM

## 2024-11-04 DIAGNOSIS — R00.2 PALPITATIONS: ICD-10-CM

## 2024-11-04 PROCEDURE — 93000 ELECTROCARDIOGRAM COMPLETE: CPT

## 2024-11-04 PROCEDURE — 99204 OFFICE O/P NEW MOD 45 MIN: CPT | Mod: 25

## 2024-11-04 RX ORDER — EMPAGLIFLOZIN AND LINAGLIPTIN 25; 5 MG/1; MG/1
25-5 TABLET, FILM COATED ORAL
Refills: 0 | Status: ACTIVE | COMMUNITY

## 2024-11-04 RX ORDER — METFORMIN HYDROCHLORIDE 1000 MG/1
1000 TABLET, COATED ORAL
Refills: 0 | Status: ACTIVE | COMMUNITY

## 2024-11-04 RX ORDER — FENOFIBRATE 145 MG/1
145 TABLET, COATED ORAL DAILY
Refills: 0 | Status: ACTIVE | COMMUNITY

## 2024-11-04 RX ORDER — ALIROCUMAB 75 MG/ML
75 INJECTION, SOLUTION SUBCUTANEOUS
Qty: 2 | Refills: 3 | Status: ACTIVE | COMMUNITY
Start: 2024-11-04 | End: 1900-01-01

## 2024-11-04 RX ORDER — EZETIMIBE 10 MG/1
10 TABLET ORAL DAILY
Refills: 0 | Status: ACTIVE | COMMUNITY

## 2024-11-04 RX ORDER — ICOSAPENT ETHYL 1000 MG/1
1 CAPSULE ORAL
Qty: 180 | Refills: 3 | Status: ACTIVE | COMMUNITY
Start: 2024-11-04

## 2024-11-04 RX ORDER — EVOLOCUMAB 140 MG/ML
140 INJECTION, SOLUTION SUBCUTANEOUS
Qty: 2 | Refills: 6 | Status: DISCONTINUED | COMMUNITY
Start: 2024-11-04 | End: 2024-11-04

## 2024-11-04 RX ORDER — PIOGLITAZONE HYDROCHLORIDE 45 MG/1
TABLET ORAL DAILY
Refills: 0 | Status: ACTIVE | COMMUNITY

## 2024-11-07 ENCOUNTER — APPOINTMENT (OUTPATIENT)
Dept: ORTHOPEDIC SURGERY | Facility: CLINIC | Age: 44
End: 2024-11-07

## 2024-12-04 ENCOUNTER — APPOINTMENT (OUTPATIENT)
Dept: CARDIOLOGY | Facility: CLINIC | Age: 44
End: 2024-12-04

## 2024-12-04 ENCOUNTER — APPOINTMENT (OUTPATIENT)
Dept: CARDIOLOGY | Facility: CLINIC | Age: 44
End: 2024-12-04
Payer: COMMERCIAL

## 2024-12-04 PROCEDURE — 93351 STRESS TTE COMPLETE: CPT

## 2024-12-04 PROCEDURE — 93325 DOPPLER ECHO COLOR FLOW MAPG: CPT

## 2024-12-04 PROCEDURE — 93880 EXTRACRANIAL BILAT STUDY: CPT

## 2024-12-04 PROCEDURE — 93320 DOPPLER ECHO COMPLETE: CPT

## 2024-12-11 ENCOUNTER — RESULT REVIEW (OUTPATIENT)
Age: 44
End: 2024-12-11

## 2024-12-11 ENCOUNTER — OUTPATIENT (OUTPATIENT)
Dept: OUTPATIENT SERVICES | Facility: HOSPITAL | Age: 44
LOS: 1 days | End: 2024-12-11
Payer: COMMERCIAL

## 2024-12-11 PROCEDURE — 72220 X-RAY EXAM SACRUM TAILBONE: CPT | Mod: 26

## 2024-12-11 PROCEDURE — 72100 X-RAY EXAM L-S SPINE 2/3 VWS: CPT | Mod: 26

## 2024-12-11 PROCEDURE — 72100 X-RAY EXAM L-S SPINE 2/3 VWS: CPT

## 2024-12-11 PROCEDURE — 72220 X-RAY EXAM SACRUM TAILBONE: CPT

## 2024-12-12 ENCOUNTER — RESULT REVIEW (OUTPATIENT)
Age: 44
End: 2024-12-12

## 2024-12-12 ENCOUNTER — OUTPATIENT (OUTPATIENT)
Dept: OUTPATIENT SERVICES | Facility: HOSPITAL | Age: 44
LOS: 1 days | End: 2024-12-12
Payer: COMMERCIAL

## 2024-12-12 DIAGNOSIS — Z00.8 ENCOUNTER FOR OTHER GENERAL EXAMINATION: ICD-10-CM

## 2024-12-12 DIAGNOSIS — N23 UNSPECIFIED RENAL COLIC: ICD-10-CM

## 2024-12-12 DIAGNOSIS — M54.9 DORSALGIA, UNSPECIFIED: ICD-10-CM

## 2024-12-12 PROCEDURE — 76770 US EXAM ABDO BACK WALL COMP: CPT

## 2024-12-12 PROCEDURE — 76770 US EXAM ABDO BACK WALL COMP: CPT | Mod: 26

## 2024-12-13 DIAGNOSIS — N23 UNSPECIFIED RENAL COLIC: ICD-10-CM

## 2025-01-08 ENCOUNTER — NON-APPOINTMENT (OUTPATIENT)
Age: 45
End: 2025-01-08

## 2025-01-28 ENCOUNTER — RX RENEWAL (OUTPATIENT)
Age: 45
End: 2025-01-28

## 2025-02-22 ENCOUNTER — NON-APPOINTMENT (OUTPATIENT)
Age: 45
End: 2025-02-22

## 2025-03-07 ENCOUNTER — APPOINTMENT (OUTPATIENT)
Dept: ORTHOPEDIC SURGERY | Facility: CLINIC | Age: 45
End: 2025-03-07
Payer: COMMERCIAL

## 2025-03-07 DIAGNOSIS — S93.401A SPRAIN OF UNSPECIFIED LIGAMENT OF RIGHT ANKLE, INITIAL ENCOUNTER: ICD-10-CM

## 2025-03-07 PROCEDURE — 73610 X-RAY EXAM OF ANKLE: CPT | Mod: RT

## 2025-03-07 PROCEDURE — 99213 OFFICE O/P EST LOW 20 MIN: CPT | Mod: 25

## 2025-04-03 ENCOUNTER — APPOINTMENT (OUTPATIENT)
Dept: ORTHOPEDIC SURGERY | Facility: CLINIC | Age: 45
End: 2025-04-03

## 2025-04-24 ENCOUNTER — RX RENEWAL (OUTPATIENT)
Age: 45
End: 2025-04-24

## 2025-06-17 ENCOUNTER — RX RENEWAL (OUTPATIENT)
Age: 45
End: 2025-06-17

## 2025-08-11 ENCOUNTER — RX RENEWAL (OUTPATIENT)
Age: 45
End: 2025-08-11

## 2025-08-19 ENCOUNTER — NON-APPOINTMENT (OUTPATIENT)
Age: 45
End: 2025-08-19